# Patient Record
Sex: FEMALE | Race: WHITE | HISPANIC OR LATINO | Employment: UNEMPLOYED | URBAN - METROPOLITAN AREA
[De-identification: names, ages, dates, MRNs, and addresses within clinical notes are randomized per-mention and may not be internally consistent; named-entity substitution may affect disease eponyms.]

---
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
## 2023-01-03 ENCOUNTER — HOSPITAL ENCOUNTER (EMERGENCY)
Facility: HOSPITAL | Age: 18
Discharge: HOME | End: 2023-01-03
Admitting: OBSTETRICS & GYNECOLOGY

## 2023-01-03 VITALS
HEART RATE: 90 BPM | OXYGEN SATURATION: 100 % | RESPIRATION RATE: 19 BRPM | DIASTOLIC BLOOD PRESSURE: 74 MMHG | SYSTOLIC BLOOD PRESSURE: 127 MMHG | TEMPERATURE: 98.9 F

## 2023-01-03 PROBLEM — Z3A.36 36 WEEKS GESTATION OF PREGNANCY: Status: ACTIVE | Noted: 2022-07-20

## 2023-01-03 PROBLEM — Z3A.37 37 WEEKS GESTATION OF PREGNANCY: Status: ACTIVE | Noted: 2022-07-20

## 2023-01-03 NOTE — PROGRESS NOTES
OB/GYN prenatal visit    S: 21 y o   37w1d here for PN visit  She has no obstetric complaints, including pelvic pain, contractions, vaginal bleeding, loss of fluid, or decreased fetal movement  She has started feeling a little pelvic pressure    O:  Vitals:    23 1342   BP: 114/77   Pulse: 93       Gen: no acute distress, nonlabored breathing  Fundal Height (cm): 37 cm  Fetal Heart Rate: 125   SVE: 1 5/50/-3, posterior, soft  TAUS: Cephalic    A/P:      IUP at 37w1d  No obstetric complaints today  TWG: + 46lbs (recommended weight gain 25-35lbs)  Contraception: Nexplanon  GBS and GC/CT collected today  Discussed term labor precautions and fetal kick counts    Return to clinic in 1 weeks    COVID 19 precautions were discussed with patient at length, reviewed symptoms, hygiene, social distancing, patient to call office  with questions/concerns    Discussed with Dr Kimani Salinas MD  2023  1:49 PM    Problem List        Other    37 weeks gestation of pregnancy    Overview     LMP unknown  25-35 lb weight gain recommended  Contraception- Nexplanon- postplacental  Plans on breast feeding  Plans on epidural  1 hr GTT 93  Anemia:  11/3/22 repeat CBC 9 9  Delivery consent signed 11/3/22  Repeat growth US at 32 weeks 22- Pyelectasis is resolved,   2022 had Tdap vaccine   22 Flu vaccine  Covid vaccine x2  Plans spontaneous labor  Perineal massage reviewed 22           Anemia during pregnancy in third trimester    Overview     Iron BID , hgb on 11 3 was 9 9         Need for influenza vaccination    Overview     Given 22         Pyelectasis of fetus on prenatal ultrasound    Overview     4mm on right at 20 weeks   - Plan 32 week U/S  Is resolved 2022         Third trimester pregnancy

## 2023-01-04 NOTE — PROGRESS NOTES
L&D Triage Note - OB/GYN  Shannan Conner 21 y o  female MRN: [id number]  Unit/Bed#: LD TRIAGE 3-01 Encounter: [id number]      ASSESSMENT/PLAN  Shannan Conner is a 21 y o   at 36w6d resented for contraction      1) Contractions  -Cervical exam -3  -Discussed reasons to return, she is likely in early labor    2)  Discharge instructions  - Patient instructed to call if experiencing worsening contractions, vaginal bleeding, loss of fluid or decreased fetal movement  - Will follow up with OBGYN in office      She is a patient of Greg Ramachandran  D/w Dr Maria Alejandra Quintero, on call OBGYN Attending Physician  ______________    SUBJECTIVE    RANDOLPH: Estimated Date of Delivery: 23    HPI:  21 y o   37w0d presents with complaint of contractions  Contractions: yes  Leakage of fluid: no  Vaginal Bleeding: no  Fetal movement: present    ROS:  Constitutional: Negative  Respiratory: Negative  Cardiovascular: Negative    Gastrointestinal: Negative    Physical Exam  General: Well appearing, no distress  Respiratory: Unlabored breathing  Cardiovascular: Regular rate  Abdomen: Soft, gravid, nontender   Fundal Height: Appropriate for gestational age  Extremities: Warm and well perfused  Non tender  OBJECTIVE:  /74 (BP Location: Right arm)   Pulse 90   Temp 98 9 °F (37 2 °C) (Oral)   Resp 19   LMP  (LMP Unknown)   SpO2 100%   There is no height or weight on file to calculate BMI  Labs: No results found for this or any previous visit (from the past 24 hour(s))        SVE:  Cervical Dilation: 1  Cervical Effacement: 50  Fetal Station: -3  Method: Manual  OB Examiner: Ernesto    FHT:  Baseline Rate: 130 bpm  Variability: Moderate 6-25 bpm  Accelerations: 15 x 15 or greater, At variable times  Decelerations: None  FHR Category: Category I    TOCO:   Contraction Frequency (minutes): 2-3  Contraction Duration (seconds): 60-90  Contraction Quality: Mild        Phylliss Prow, DO  OB/GYN PGY-3  1/4/2023  1:44 AM      Portions of the record may have been created with voice recognition software  Occasional wrong word or "sound a like" substitutions may have occurred due to the inherent limitations of voice recognition software    Read the chart carefully and recognize, using context, where substitutions have occurred

## 2023-01-05 ENCOUNTER — ROUTINE PRENATAL (OUTPATIENT)
Dept: OBGYN CLINIC | Facility: CLINIC | Age: 18
End: 2023-01-05

## 2023-01-05 VITALS
SYSTOLIC BLOOD PRESSURE: 114 MMHG | DIASTOLIC BLOOD PRESSURE: 77 MMHG | HEIGHT: 59 IN | BODY MASS INDEX: 29.64 KG/M2 | HEART RATE: 93 BPM | WEIGHT: 147 LBS

## 2023-01-05 DIAGNOSIS — Z3A.37 37 WEEKS GESTATION OF PREGNANCY: ICD-10-CM

## 2023-01-05 DIAGNOSIS — Z34.93 THIRD TRIMESTER PREGNANCY: Primary | ICD-10-CM

## 2023-01-05 DIAGNOSIS — O99.013 ANEMIA DURING PREGNANCY IN THIRD TRIMESTER: ICD-10-CM

## 2023-01-06 LAB
C TRACH DNA SPEC QL NAA+PROBE: NEGATIVE
N GONORRHOEA DNA SPEC QL NAA+PROBE: NEGATIVE

## 2023-01-07 LAB — GP B STREP DNA SPEC QL NAA+PROBE: NEGATIVE

## 2023-01-10 ENCOUNTER — TELEPHONE (OUTPATIENT)
Dept: OBGYN CLINIC | Facility: CLINIC | Age: 18
End: 2023-01-10

## 2023-01-10 PROBLEM — Z3A.38 38 WEEKS GESTATION OF PREGNANCY: Status: ACTIVE | Noted: 2022-07-20

## 2023-01-10 NOTE — TELEPHONE ENCOUNTER
----- Message from Elkin Giron MD sent at 1/9/2023  6:30 AM EST -----  GBS negative, GC/CT negative   Can review further at next prenatal appointment

## 2023-01-10 NOTE — PROGRESS NOTES
OB/GYN prenatal visit    S: 21 y o   38w1d here for PN visit  She has no obstetric complaints, including pelvic pain, contractions, vaginal bleeding, loss of fluid, or decreased fetal movement  She has started feeling irregular contractions and has had more mucoid discharge recently  O:  Vitals:    23 1446   BP: 121/76   Pulse: 96       Gen: no acute distress, nonlabored breathing  Fundal Height (cm): 38 cm  Fetal Heart Rate: 125    A/P:      IUP at 38w1d  No obstetric complaints today  TWG: + 53lbs (recommended weight gain 25-35lbs)  Contraception: Nexplanon  Discussed term labor precautions and fetal kick counts    Return to clinic in 1 week    Reviewed ARRIVE trial and options for elective induction of labor starting at 39 weeks gestation  Reviewed recommendation for indicated induction of labor after 41 weeks  Reviewed risks, benefits, and alternatives, as well as process of induction  At this time, Shannan would like to plan for elective induction of labor  Reviewed process of induction including cervical ripening with cytotec and arnold balloon, as well as use of pitocin  Reviewed risks and benefits of induction with patient, including but not limited to  section for malpresentation, arrest disorders, or fetal intolerance, and operative delivery for fetal intolerance or maternal exhaustion  Patient aware of risks and benefits and elects to proceed with induction    Consent signed today, Hai Shelley scheduled for 3PM 23    COVID 19 precautions were discussed with patient at length, reviewed symptoms, hygiene, social distancing, patient to call office  with questions/concerns    Discussed with Dr Luis Fernando Munson MD  2023  3:10 PM    Problem List        Other    38 weeks gestation of pregnancy    Overview     LMP unknown  25-35 lb weight gain recommended  Contraception- Nexplanon- postplacental  Plans on breast feeding  Plans on epidural  1 hr GTT 93  Anemia:  11/3/22 repeat CBC 9 9  Delivery consent signed 11/3/22  Repeat growth US at 32 weeks 12/1/22- Pyelectasis is resolved,   11/03/2022 had Tdap vaccine   11/17/22 Flu vaccine  Covid vaccine x2  Plans spontaneous labor  Perineal massage reviewed 12/19/22  GBS neg 1/5/23         Anemia during pregnancy in third trimester    Overview     Iron BID , hgb on 11 3 was 9 9         Need for influenza vaccination    Overview     Given 11/17/22         Pyelectasis of fetus on prenatal ultrasound    Overview     4mm on right at 20 weeks   - Plan 32 week U/S  Is resolved 12/01/2022         Third trimester pregnancy

## 2023-01-11 PROCEDURE — T1002 RN SERVICES UP TO 15 MINUTES: HCPCS

## 2023-01-12 ENCOUNTER — ROUTINE PRENATAL (OUTPATIENT)
Dept: OBGYN CLINIC | Facility: CLINIC | Age: 18
End: 2023-01-12

## 2023-01-12 VITALS
DIASTOLIC BLOOD PRESSURE: 76 MMHG | WEIGHT: 154 LBS | HEIGHT: 59 IN | HEART RATE: 96 BPM | BODY MASS INDEX: 31.04 KG/M2 | SYSTOLIC BLOOD PRESSURE: 121 MMHG

## 2023-01-12 DIAGNOSIS — Z3A.38 38 WEEKS GESTATION OF PREGNANCY: ICD-10-CM

## 2023-01-12 DIAGNOSIS — Z34.93 THIRD TRIMESTER PREGNANCY: Primary | ICD-10-CM

## 2023-01-12 DIAGNOSIS — O99.013 ANEMIA DURING PREGNANCY IN THIRD TRIMESTER: ICD-10-CM

## 2023-01-18 ENCOUNTER — ANESTHESIA (INPATIENT)
Dept: ANESTHESIOLOGY | Facility: HOSPITAL | Age: 18
End: 2023-01-18

## 2023-01-18 ENCOUNTER — ANESTHESIA EVENT (INPATIENT)
Dept: ANESTHESIOLOGY | Facility: HOSPITAL | Age: 18
End: 2023-01-18

## 2023-01-18 ENCOUNTER — HOSPITAL ENCOUNTER (EMERGENCY)
Dept: LABOR AND DELIVERY | Facility: HOSPITAL | Age: 18
Discharge: HOME | End: 2023-01-18

## 2023-01-18 ENCOUNTER — HOSPITAL ENCOUNTER (INPATIENT)
Facility: HOSPITAL | Age: 18
LOS: 3 days | Discharge: HOME | End: 2023-01-21
Attending: OBSTETRICS & GYNECOLOGY | Admitting: OBSTETRICS & GYNECOLOGY

## 2023-01-18 DIAGNOSIS — Z3A.38 38 WEEKS GESTATION OF PREGNANCY: Primary | ICD-10-CM

## 2023-01-18 PROBLEM — Z3A.39 39 WEEKS GESTATION OF PREGNANCY: Status: ACTIVE | Noted: 2022-07-20

## 2023-01-18 LAB
ABO GROUP BLD: NORMAL
BASOPHILS # BLD AUTO: 0.01 THOUSANDS/ÂΜL (ref 0–0.1)
BASOPHILS NFR BLD AUTO: 0 % (ref 0–1)
BLD GP AB SCN SERPL QL: NEGATIVE
EOSINOPHIL # BLD AUTO: 0.01 THOUSAND/ÂΜL (ref 0–0.61)
EOSINOPHIL NFR BLD AUTO: 0 % (ref 0–6)
ERYTHROCYTE [DISTWIDTH] IN BLOOD BY AUTOMATED COUNT: 15.9 % (ref 11.6–15.1)
HCT VFR BLD AUTO: 32.8 % (ref 34.8–46.1)
HGB BLD-MCNC: 10.6 G/DL (ref 11.5–15.4)
HOLD SPECIMEN: NORMAL
IMM GRANULOCYTES # BLD AUTO: 0.03 THOUSAND/UL (ref 0–0.2)
IMM GRANULOCYTES NFR BLD AUTO: 1 % (ref 0–2)
LYMPHOCYTES # BLD AUTO: 1.34 THOUSANDS/ÂΜL (ref 0.6–4.47)
LYMPHOCYTES NFR BLD AUTO: 23 % (ref 14–44)
MCH RBC QN AUTO: 29 PG (ref 26.8–34.3)
MCHC RBC AUTO-ENTMCNC: 32.3 G/DL (ref 31.4–37.4)
MCV RBC AUTO: 90 FL (ref 82–98)
MONOCYTES # BLD AUTO: 0.44 THOUSAND/ÂΜL (ref 0.17–1.22)
MONOCYTES NFR BLD AUTO: 8 % (ref 4–12)
NEUTROPHILS # BLD AUTO: 3.94 THOUSANDS/ÂΜL (ref 1.85–7.62)
NEUTS SEG NFR BLD AUTO: 68 % (ref 43–75)
NRBC BLD AUTO-RTO: 0 /100 WBCS
PLATELET # BLD AUTO: 158 THOUSANDS/UL (ref 149–390)
PMV BLD AUTO: 12.5 FL (ref 8.9–12.7)
RBC # BLD AUTO: 3.66 MILLION/UL (ref 3.81–5.12)
RH BLD: POSITIVE
SPECIMEN EXPIRATION DATE: NORMAL
WBC # BLD AUTO: 5.77 THOUSAND/UL (ref 4.31–10.16)

## 2023-01-18 PROCEDURE — 4A1HXCZ MONITORING OF PRODUCTS OF CONCEPTION, CARDIAC RATE, EXTERNAL APPROACH: ICD-10-PCS | Performed by: STUDENT IN AN ORGANIZED HEALTH CARE EDUCATION/TRAINING PROGRAM

## 2023-01-18 RX ORDER — OXYTOCIN/RINGER'S LACTATE 30/500 ML
1-30 PLASTIC BAG, INJECTION (ML) INTRAVENOUS
Status: DISCONTINUED | OUTPATIENT
Start: 2023-01-19 | End: 2023-01-19

## 2023-01-18 RX ORDER — SODIUM CHLORIDE, SODIUM LACTATE, POTASSIUM CHLORIDE, CALCIUM CHLORIDE 600; 310; 30; 20 MG/100ML; MG/100ML; MG/100ML; MG/100ML
125 INJECTION, SOLUTION INTRAVENOUS CONTINUOUS
Status: DISCONTINUED | OUTPATIENT
Start: 2023-01-18 | End: 2023-01-19

## 2023-01-18 RX ORDER — LIDOCAINE HYDROCHLORIDE AND EPINEPHRINE 15; 5 MG/ML; UG/ML
INJECTION, SOLUTION EPIDURAL AS NEEDED
Status: DISCONTINUED | OUTPATIENT
Start: 2023-01-18 | End: 2023-01-20 | Stop reason: HOSPADM

## 2023-01-18 RX ADMIN — ROPIVACAINE HYDROCHLORIDE: 2 INJECTION, SOLUTION EPIDURAL; INFILTRATION at 22:40

## 2023-01-18 RX ADMIN — LIDOCAINE HYDROCHLORIDE AND EPINEPHRINE 2 ML: 15; 5 INJECTION, SOLUTION EPIDURAL at 22:32

## 2023-01-18 RX ADMIN — LIDOCAINE HYDROCHLORIDE AND EPINEPHRINE 3 ML: 15; 5 INJECTION, SOLUTION EPIDURAL at 22:30

## 2023-01-18 RX ADMIN — SODIUM CHLORIDE, SODIUM LACTATE, POTASSIUM CHLORIDE, AND CALCIUM CHLORIDE 125 ML/HR: .6; .31; .03; .02 INJECTION, SOLUTION INTRAVENOUS at 21:40

## 2023-01-18 NOTE — PLAN OF CARE
Problem: PAIN - ADULT  Goal: Verbalizes/displays adequate comfort level or baseline comfort level  Description: Interventions:  - Encourage patient to monitor pain and request assistance  - Assess pain using appropriate pain scale  - Administer analgesics based on type and severity of pain and evaluate response  - Implement non-pharmacological measures as appropriate and evaluate response  - Consider cultural and social influences on pain and pain management  - Notify physician/advanced practitioner if interventions unsuccessful or patient reports new pain  Outcome: Progressing     Problem: INFECTION - ADULT  Goal: Absence or prevention of progression during hospitalization  Description: INTERVENTIONS:  - Assess and monitor for signs and symptoms of infection  - Monitor lab/diagnostic results  - Monitor all insertion sites, i e  indwelling lines, tubes, and drains  - Monitor endotracheal if appropriate and nasal secretions for changes in amount and color  - [place] appropriate cooling/warming therapies per order  - Administer medications as ordered  - Instruct and encourage patient and family to use good hand hygiene technique  - Identify and instruct in appropriate isolation precautions for identified infection/condition  Outcome: Progressing  Goal: Absence of fever/infection during neutropenic period  Description: INTERVENTIONS:  - Monitor WBC    Outcome: Progressing     Problem: SAFETY ADULT  Goal: Patient will remain free of falls  Description: INTERVENTIONS:  - Educate patient/family on patient safety including physical limitations  - Instruct patient to call for assistance with activity   - Consult OT/PT to assist with strengthening/mobility   - Keep Call bell within reach  - Keep bed low and locked with side rails adjusted as appropriate  - Keep care items and personal belongings within reach  - Initiate and maintain comfort rounds  - Consider moving patient to room near nurses station  Outcome: Progressing  Goal: Maintain or return to baseline ADL function  Description: INTERVENTIONS:  -  Assess patient's ability to carry out ADLs; assess patient's baseline for ADL function and identify physical deficits which impact ability to perform ADLs (bathing, care of mouth/teeth, toileting, grooming, dressing, etc )  - Assess/evaluate cause of self-care deficits   - Assess range of motion  - Assess patient's mobility; develop plan if impaired  - Assess patient's need for assistive devices and provide as appropriate  - Encourage maximum independence but intervene and supervise when necessary  - Involve family in performance of ADLs  - Assess for home care needs following discharge   - Consider OT consult to assist with ADL evaluation and planning for discharge  - Provide patient education as appropriate  Outcome: Progressing  Goal: Maintains/Returns to pre admission functional level  Description: INTERVENTIONS:  - Perform BMAT or MOVE assessment daily    - Set and communicate daily mobility goal to care team and patient/family/caregiver  - Collaborate with rehabilitation services on mobility goals if consulted  - Out of bed for toileting  - Record patient progress and toleration of activity level   Outcome: Progressing     Problem: Knowledge Deficit  Goal: Patient/family/caregiver demonstrates understanding of disease process, treatment plan, medications, and discharge instructions  Description: Complete learning assessment and assess knowledge base  Interventions:  - Provide teaching at level of understanding  - Provide teaching via preferred learning methods  Outcome: Progressing  Goal: Verbalizes understanding of labor plan  Description: Assess patient/family/caregiver's baseline knowledge level and ability to understand information  Provide education via patient/family/caregiver's preferred learning method at appropriate level of understanding  1  Provide teaching at level of understanding    2  Provide teaching via preferred learning method(s)  Outcome: Progressing     Problem: DISCHARGE PLANNING  Goal: Discharge to home or other facility with appropriate resources  Description: INTERVENTIONS:  - Identify barriers to discharge w/patient and caregiver  - Arrange for needed discharge resources and transportation as appropriate  - Identify discharge learning needs (meds, wound care, etc )  - Arrange for interpretive services to assist at discharge as needed  - Refer to Case Management Department for coordinating discharge planning if the patient needs post-hospital services based on physician/advanced practitioner order or complex needs related to functional status, cognitive ability, or social support system  Outcome: Progressing     Problem: Labor & Delivery  Goal: Manages discomfort  Description: Assess and monitor for signs and symptoms of discomfort  Assess patient's pain level regularly and per hospital policy  Administer medications as ordered  Support use of nonpharmacological methods to help control pain such as distraction, imagery, relaxation, and application of heat and cold  Collaborate with interdisciplinary team and patient to determine appropriate pain management plan  1  Include patient in decisions related to comfort  2  Offer non-pharmacological pain management interventions  3  Report ineffective pain management to physician  Outcome: Progressing  Goal: Patient vital signs are stable  Description: 1  Assess vital signs - vaginal delivery    Outcome: Progressing

## 2023-01-18 NOTE — OB LABOR/OXYTOCIN SAFETY PROGRESS
Labor Progress Note - Shannan Chen Neighbours 21 y o  female MRN: [id number]    Unit/Bed#: LD TRIAGE 3-01 Encounter: [id number]       Contraction Frequency (minutes): 6-9  Contraction Quality: Mild  Tachysystole: No   Cervical Dilation: 1-2        Cervical Effacement: 50  Fetal Station: -2  Baseline Rate: 135 bpm  Fetal Heart Rate: 143 BPM                  Vital Signs:   Vitals:    01/18/23 1633   BP: 132/83   Pulse: 85   Resp: 20   Temp: 98 6 °F (37 °C)       Notes/comments: FHT Cat I, toco irregular  PROCEDURE:  ARNOLD BALLOON PLACEMENT    A 24F arnold with a 30cc balloon was selected, SVE was performed and cervix was located, arnold was introduced over sterile gloved hands  Balloon advanced through cervix beyond the internal cervical os  A small amount amount of sterile saline solution was instilled in the balloon to confirm placement  Placement was confirmed to be beyond the internal cervical os  A total of 60cc of sterile saline solution was placed into the balloon  Pt tolerated well  Instructions left with RN to place arnold to gravity with a 1L bag of IV fluid  Notify MD when arnold dislodged      Baldemar Lesches, MD Baldemar Lesches, MD 1/18/2023 6:46 PM

## 2023-01-18 NOTE — H&P
H & P- Obstetrics   [facility] Shoaib Tello 21 y o  female MRN: [id number]  Unit/Bed#: Alexis Carmichael Encounter: [id number]    Assessment: 21 y o   at 39w0d admitted for eIOL  SVE: 1 /-2  FHT: Cat I  Clinical EFW: 7 ; Cephalic confirmed by ultrasound  GBS status: negative   Postpartum contraception plan: Nexplanon    Plan:   Anemia during pregnancy in third trimester  Assessment & Plan  Hgb 10 6 on admission    * 39 weeks gestation of pregnancy  Assessment & Plan  Admit   T&S, CBC, RPR  CLD  IV fluids  GBS prophylaxis is not needed  Induction with FB in triage, pitocin when in labor room        Discussed case and plan w/ Dr Rolan Mckeon      Chief Complaint: Jazmin Avila    HPI: Chris Zavaleta is a 21 y o   with an RANDOLPH of 2023, by Ultrasound at 39w0d who is being admitted for eIOL  She is having uterine contractions q30-40 mins, has no LOF, and reports no VB  She states she has felt good FM  Patient Active Problem List   Diagnosis   • 39 weeks gestation of pregnancy   • Third trimester pregnancy   • Pyelectasis of fetus on prenatal ultrasound   • Need for influenza vaccination   • Anemia during pregnancy in third trimester       Baby complications/comments: none    Review of Systems   Constitutional: Negative for chills and fever  Respiratory: Negative for cough and shortness of breath  Cardiovascular: Negative for chest pain and leg swelling  Gastrointestinal: Negative for abdominal pain, nausea and vomiting  Genitourinary: Negative for dysuria, pelvic pain, urgency, vaginal bleeding and vaginal discharge  Neurological: Negative for dizziness, light-headedness and headaches  All other systems reviewed and are negative        OB Hx:  OB History    Para Term  AB Living   1             SAB IAB Ectopic Multiple Live Births                  # Outcome Date GA Lbr Stephen/2nd Weight Sex Delivery Anes PTL Lv   1 Current                Past Medical Hx:  Past Medical History: Diagnosis Date   • Anemia during pregnancy in third trimester 2022       Past Surgical hx:  History reviewed  No pertinent surgical history  Social Hx:  Alcohol use: no  Tobacco use: no  Other substance use: no    No Known Allergies    Medications Prior to Admission   Medication   • ferrous sulfate 324 (65 Fe) mg   • Prenatal Vit-Fe Fumarate-FA (M- Plus) 27-1 MG TABS   • docusate sodium (COLACE) 100 mg capsule       Objective:  Temp:  [98 6 °F (37 °C)] 98 6 °F (37 °C)  HR:  [85] 85  Resp:  [20] 20  BP: (132)/(83) 132/83  Body mass index is 31 1 kg/m²  Physical Exam:  Physical Exam  Constitutional:       Appearance: Normal appearance  Cardiovascular:      Rate and Rhythm: Normal rate and regular rhythm  Heart sounds: No murmur heard  No friction rub  No gallop  Pulmonary:      Effort: Pulmonary effort is normal  No respiratory distress  Breath sounds: No wheezing  Abdominal:      Palpations: Abdomen is soft  Tenderness: There is no abdominal tenderness  Musculoskeletal:         General: No swelling or tenderness  Neurological:      Mental Status: She is alert and oriented to person, place, and time  Skin:     General: Skin is warm and dry  Psychiatric:         Mood and Affect: Mood normal    Vitals reviewed              FHT:  Baseline Rate: 135 bpm  Variability: Moderate 6-25 bpm  Accelerations: 15 x 15 or greater, At variable times  Decelerations: None    TOCO:   Contraction Frequency (minutes): 6-9  Contraction Duration (seconds): 60-90  Contraction Quality: Mild    Lab Results   Component Value Date    WBC 5 77 2023    HGB 10 6 (L) 2023    HCT 32 8 (L) 2023     2023     Lab Results   Component Value Date    K 3 4 (L) 2022     2022    CO2 24 2022    BUN 5 2022    CREATININE 0 41 (L) 2022    AST 9 (L) 2022    ALT 5 (L) 2022     Prenatal Labs: Reviewed      Blood type: B Pos  Antibody: Neg  GBS: Neg  HIV: NonR  Rubella: Immune  VDRL/RPR: Non reactive  HBsAg: Negative  Chlamydia: Negative  Gonorrhea: Negative  Diabetes 1 hour screen: 93  Platelets: 695  Hgb: 10 6  >2 Midnights  INPATIENT     Signature/Title:  Kimmy Huber MD  Date: 1/18/2023  Time: 6:04 PM

## 2023-01-19 PROBLEM — O13.9 GESTATIONAL HYPERTENSION: Status: ACTIVE | Noted: 2023-01-19

## 2023-01-19 LAB
ALBUMIN SERPL BCP-MCNC: 3.2 G/DL (ref 3.5–5)
ALP SERPL-CCNC: 212 U/L (ref 34–104)
ALT SERPL W P-5'-P-CCNC: 7 U/L (ref 7–52)
ANION GAP SERPL CALCULATED.3IONS-SCNC: 8 MMOL/L (ref 4–13)
AST SERPL W P-5'-P-CCNC: 15 U/L (ref 13–39)
BASE EXCESS BLDCOA CALC-SCNC: -10.7 MMOL/L (ref 3–11)
BASE EXCESS BLDCOV CALC-SCNC: -6.8 MMOL/L (ref 1–9)
BILIRUB SERPL-MCNC: 1.21 MG/DL (ref 0.2–1)
BUN SERPL-MCNC: 9 MG/DL (ref 5–25)
CALCIUM ALBUM COR SERPL-MCNC: 9.5 MG/DL (ref 8.3–10.1)
CALCIUM SERPL-MCNC: 8.9 MG/DL (ref 8.4–10.2)
CHLORIDE SERPL-SCNC: 108 MMOL/L (ref 96–108)
CO2 SERPL-SCNC: 19 MMOL/L (ref 21–32)
CREAT SERPL-MCNC: 0.59 MG/DL (ref 0.6–1.3)
CREAT UR-MCNC: 177.3 MG/DL
ERYTHROCYTE [DISTWIDTH] IN BLOOD BY AUTOMATED COUNT: 16 % (ref 11.6–15.1)
GFR SERPL CREATININE-BSD FRML MDRD: 133 ML/MIN/1.73SQ M
GLUCOSE SERPL-MCNC: 72 MG/DL (ref 65–140)
HCO3 BLDCOA-SCNC: 17.1 MMOL/L (ref 17.3–27.3)
HCO3 BLDCOV-SCNC: 19.7 MMOL/L (ref 12.2–28.6)
HCT VFR BLD AUTO: 38 % (ref 34.8–46.1)
HGB BLD-MCNC: 12.1 G/DL (ref 11.5–15.4)
MCH RBC QN AUTO: 28.9 PG (ref 26.8–34.3)
MCHC RBC AUTO-ENTMCNC: 31.8 G/DL (ref 31.4–37.4)
MCV RBC AUTO: 91 FL (ref 82–98)
O2 CT VFR BLDCOA CALC: 6.2 ML/DL
OXYHGB MFR BLDCOA: 33.6 %
OXYHGB MFR BLDCOV: 59.5 %
PCO2 BLDCOA: 45.3 MM[HG] (ref 30–60)
PCO2 BLDCOV: 42.6 MM HG (ref 27–43)
PH BLDCOA: 7.2 [PH] (ref 7.23–7.43)
PH BLDCOV: 7.28 [PH] (ref 7.19–7.49)
PLATELET # BLD AUTO: 153 THOUSANDS/UL (ref 149–390)
PMV BLD AUTO: 12 FL (ref 8.9–12.7)
PO2 BLDCOA: 20.6 MM HG (ref 5–25)
PO2 BLDCOV: 29.5 MM HG (ref 15–45)
POTASSIUM SERPL-SCNC: 3.8 MMOL/L (ref 3.5–5.3)
PROT SERPL-MCNC: 6 G/DL (ref 6.4–8.4)
PROT UR-MCNC: 107 MG/DL
PROT/CREAT UR: 0.6 MG/G{CREAT} (ref 0–0.1)
RBC # BLD AUTO: 4.19 MILLION/UL (ref 3.81–5.12)
RPR SER QL: NORMAL
SAO2 % BLDCOV: 11.8 ML/DL
SODIUM SERPL-SCNC: 135 MMOL/L (ref 135–147)
WBC # BLD AUTO: 15.3 THOUSAND/UL (ref 4.31–10.16)

## 2023-01-19 PROCEDURE — 0KQM0ZZ REPAIR PERINEUM MUSCLE, OPEN APPROACH: ICD-10-PCS | Performed by: OBSTETRICS & GYNECOLOGY

## 2023-01-19 PROCEDURE — 3E033VJ INTRODUCTION OF OTHER HORMONE INTO PERIPHERAL VEIN, PERCUTANEOUS APPROACH: ICD-10-PCS | Performed by: OBSTETRICS & GYNECOLOGY

## 2023-01-19 PROCEDURE — 10907ZC DRAINAGE OF AMNIOTIC FLUID, THERAPEUTIC FROM PRODUCTS OF CONCEPTION, VIA NATURAL OR ARTIFICIAL OPENING: ICD-10-PCS | Performed by: OBSTETRICS & GYNECOLOGY

## 2023-01-19 RX ORDER — DOCUSATE SODIUM 100 MG/1
100 CAPSULE, LIQUID FILLED ORAL 2 TIMES DAILY
Status: DISCONTINUED | OUTPATIENT
Start: 2023-01-19 | End: 2023-01-21 | Stop reason: HOSPADM

## 2023-01-19 RX ORDER — ONDANSETRON 2 MG/ML
4 INJECTION INTRAMUSCULAR; INTRAVENOUS EVERY 4 HOURS PRN
Status: DISCONTINUED | OUTPATIENT
Start: 2023-01-19 | End: 2023-01-19

## 2023-01-19 RX ORDER — CALCIUM CARBONATE 200(500)MG
1000 TABLET,CHEWABLE ORAL DAILY PRN
Status: DISCONTINUED | OUTPATIENT
Start: 2023-01-19 | End: 2023-01-21 | Stop reason: HOSPADM

## 2023-01-19 RX ORDER — OXYTOCIN/RINGER'S LACTATE 30/500 ML
250 PLASTIC BAG, INJECTION (ML) INTRAVENOUS ONCE
Status: COMPLETED | OUTPATIENT
Start: 2023-01-19 | End: 2023-01-19

## 2023-01-19 RX ORDER — DIPHENHYDRAMINE HCL 25 MG
25 TABLET ORAL EVERY 6 HOURS PRN
Status: DISCONTINUED | OUTPATIENT
Start: 2023-01-19 | End: 2023-01-21 | Stop reason: HOSPADM

## 2023-01-19 RX ORDER — ACETAMINOPHEN 325 MG/1
650 TABLET ORAL EVERY 4 HOURS PRN
Status: DISCONTINUED | OUTPATIENT
Start: 2023-01-19 | End: 2023-01-21 | Stop reason: HOSPADM

## 2023-01-19 RX ORDER — DIAPER,BRIEF,INFANT-TODD,DISP
1 EACH MISCELLANEOUS DAILY PRN
Status: DISCONTINUED | OUTPATIENT
Start: 2023-01-19 | End: 2023-01-21 | Stop reason: HOSPADM

## 2023-01-19 RX ORDER — IBUPROFEN 600 MG/1
600 TABLET ORAL EVERY 6 HOURS
Status: DISCONTINUED | OUTPATIENT
Start: 2023-01-19 | End: 2023-01-21 | Stop reason: HOSPADM

## 2023-01-19 RX ORDER — ONDANSETRON 2 MG/ML
4 INJECTION INTRAMUSCULAR; INTRAVENOUS EVERY 8 HOURS PRN
Status: DISCONTINUED | OUTPATIENT
Start: 2023-01-19 | End: 2023-01-21 | Stop reason: HOSPADM

## 2023-01-19 RX ORDER — SIMETHICONE 80 MG
80 TABLET,CHEWABLE ORAL 4 TIMES DAILY PRN
Status: DISCONTINUED | OUTPATIENT
Start: 2023-01-19 | End: 2023-01-21 | Stop reason: HOSPADM

## 2023-01-19 RX ADMIN — SODIUM CHLORIDE, SODIUM LACTATE, POTASSIUM CHLORIDE, AND CALCIUM CHLORIDE 125 ML/HR: .6; .31; .03; .02 INJECTION, SOLUTION INTRAVENOUS at 02:10

## 2023-01-19 RX ADMIN — Medication 2 MILLI-UNITS/MIN: at 00:02

## 2023-01-19 RX ADMIN — DOCUSATE SODIUM 100 MG: 100 CAPSULE, LIQUID FILLED ORAL at 17:45

## 2023-01-19 RX ADMIN — ROPIVACAINE HYDROCHLORIDE: 2 INJECTION, SOLUTION EPIDURAL; INFILTRATION at 11:24

## 2023-01-19 RX ADMIN — IBUPROFEN 600 MG: 600 TABLET, FILM COATED ORAL at 20:20

## 2023-01-19 RX ADMIN — ROPIVACAINE HYDROCHLORIDE: 2 INJECTION, SOLUTION EPIDURAL; INFILTRATION at 05:55

## 2023-01-19 RX ADMIN — IBUPROFEN 600 MG: 600 TABLET, FILM COATED ORAL at 15:13

## 2023-01-19 RX ADMIN — ONDANSETRON 4 MG: 2 INJECTION INTRAMUSCULAR; INTRAVENOUS at 08:09

## 2023-01-19 RX ADMIN — Medication 250 MILLI-UNITS/MIN: at 13:43

## 2023-01-19 RX ADMIN — SODIUM CHLORIDE, SODIUM LACTATE, POTASSIUM CHLORIDE, AND CALCIUM CHLORIDE 125 ML/HR: .6; .31; .03; .02 INJECTION, SOLUTION INTRAVENOUS at 05:33

## 2023-01-19 NOTE — OB LABOR/OXYTOCIN SAFETY PROGRESS
Oxytocin Safety Progress Check Note - Shannan Tello 21 y o  female MRN: [id number]    Unit/Bed#: -01 Encounter: [id number]    Dose (leela-units/min) Oxytocin: 8 leela-units/min  Contraction Frequency (minutes): 1 5-4  Contraction Quality: Strong  Tachysystole: No   Cervical Dilation: 9        Cervical Effacement: 90  Fetal Station: -1  Baseline Rate: 140 bpm  Fetal Heart Rate: 132 BPM  FHR Category: Category II               Vital Signs:   Vitals:    01/19/23 0645   BP: 130/88   Pulse: (!) 106   Resp:    Temp:    SpO2:        Notes/comments: Patient feeling pressure, FHT Cat I, toco q2  Discussed with attending          Valdemar Mehta MD 1/19/2023 6:59 AM

## 2023-01-19 NOTE — OB LABOR/OXYTOCIN SAFETY PROGRESS
Oxytocin Safety Progress Check Note - Shannan Lynch [place]ors 21 y o  female MRN: [id number]    Unit/Bed#: -01 Encounter: [id number]    Dose (leela-units/min) Oxytocin: 8 leela-units/min  Contraction Frequency (minutes): 1-2 5  Contraction Quality: Strong  Tachysystole: No   Cervical Dilation: 10  Dilation Complete Date: 01/19/23     Cervical Effacement: 100  Fetal Station: 2  Baseline Rate: 130 bpm  Fetal Heart Rate: 128 BPM  FHR Category: Category I    Vital Signs:   Vitals:    01/19/23 0730   BP: 143/100   Pulse: (!) 111   Resp:    Temp:    SpO2:        Notes/comments:     Shannan is feeling uncomfortable  On cervical exam, she is complete and +2  FHT is category I  Plan to start pushing         Mike Maria, [place] 1/19/2023 7:48 AM

## 2023-01-19 NOTE — OB LABOR/OXYTOCIN SAFETY PROGRESS
Oxytocin Safety Progress Check Note - Shannan Zhu 21 y o  female MRN: [id number]    Unit/Bed#: -01 Encounter: [id number]    Dose (leela-units/min) Oxytocin: 4 leela-units/min  Contraction Frequency (minutes): 1-4  Contraction Quality: Moderate  Tachysystole: No   Cervical Dilation: 5-6  Cervical Effacement: 80  Fetal Station: -2  Baseline Rate: 140 bpm  Fetal Heart Rate: 130 BPM  FHR Category: Category I      Vital Signs:   Vitals:    01/19/23 0330   BP: 118/72   Pulse: 100   Resp:    Temp:    SpO2:        Notes/comments: Shannan is comfortable with her epidural  Goo cervical change noted  AROM performed  Thin meconium noted  Tracing cat I  Continue to titrate Pitocin  Will discuss with Dr Jeri JIMENEZ MD 1/19/2023 3:56 AM

## 2023-01-19 NOTE — PLAN OF CARE
Problem: PAIN - ADULT  Goal: Verbalizes/displays adequate comfort level or baseline comfort level  Description: Interventions:  - Encourage patient to monitor pain and request assistance  - Assess pain using appropriate pain scale  - Administer analgesics based on type and severity of pain and evaluate response  - Implement non-pharmacological measures as appropriate and evaluate response  - Consider cultural and social influences on pain and pain management  - Notify physician/advanced practitioner if interventions unsuccessful or patient reports new pain  Outcome: Progressing     Problem: INFECTION - ADULT  Goal: Absence or prevention of progression during hospitalization  Description: INTERVENTIONS:  - Assess and monitor for signs and symptoms of infection  - Monitor lab/diagnostic results  - Monitor all insertion sites, i e  indwelling lines, tubes, and drains  - [place] appropriate cooling/warming therapies per order  - Administer medications as ordered  - Instruct and encourage patient and family to use good hand hygiene technique  Outcome: Progressing  Goal: Absence of fever/infection during neutropenic period  Description: INTERVENTIONS:  - Monitor WBC    Outcome: Progressing     Problem: SAFETY ADULT  Goal: Patient will remain free of falls  Description: INTERVENTIONS:  - Educate patient/family on patient safety including physical limitations  - Instruct patient to call for assistance with activity   - Consult OT/PT to assist with strengthening/mobility   - Keep Call bell within reach  - Keep bed low and locked with side rails adjusted as appropriate  - Keep care items and personal belongings within reach  - Initiate and maintain comfort rounds  Outcome: Progressing  Goal: Maintain or return to baseline ADL function  Description: INTERVENTIONS:  -  Assess patient's ability to carry out ADLs; assess patient's baseline for ADL function and identify physical deficits which impact ability to perform ADLs (bathing, care of mouth/teeth, toileting, grooming, dressing, etc )  - Assess/evaluate cause of self-care deficits   - Assess range of motion  - Assess patient's mobility; develop plan if impaired  - Assess patient's need for assistive devices and provide as appropriate  - Encourage maximum independence but intervene and supervise when necessary  - Involve family in performance of ADLs  - Assess for home care needs following discharge   - Consider OT consult to assist with ADL evaluation and planning for discharge  - Provide patient education as appropriate  Outcome: Progressing  Goal: Maintains/Returns to pre admission functional level  Description: INTERVENTIONS:  - Perform BMAT or MOVE assessment daily    - Set and communicate daily mobility goal to care team and patient/family/caregiver  - Collaborate with rehabilitation services on mobility goals if consulted  - Out of bed for toileting  - Record patient progress and toleration of activity level   Outcome: Progressing     Problem: Knowledge Deficit  Goal: Patient/family/caregiver demonstrates understanding of disease process, treatment plan, medications, and discharge instructions  Description: Complete learning assessment and assess knowledge base  Interventions:  - Provide teaching at level of understanding  - Provide teaching via preferred learning methods  Outcome: Progressing  Goal: Verbalizes understanding of labor plan  Description: Assess patient/family/caregiver's baseline knowledge level and ability to understand information  Provide education via patient/family/caregiver's preferred learning method at appropriate level of understanding  1  Provide teaching at level of understanding  2  Provide teaching via preferred learning method(s)    Outcome: Progressing     Problem: DISCHARGE PLANNING  Goal: Discharge to home or other facility with appropriate resources  Description: INTERVENTIONS:  - Identify barriers to discharge w/patient and caregiver  - Arrange for needed discharge resources and transportation as appropriate  - Identify discharge learning needs (meds, wound care, etc )  - Arrange for interpretive services to assist at discharge as needed  - Refer to Case Management Department for coordinating discharge planning if the patient needs post-hospital services based on physician/advanced practitioner order or complex needs related to functional status, cognitive ability, or social support system  Outcome: Progressing     Problem: Labor & Delivery  Goal: Manages discomfort  Description: Assess and monitor for signs and symptoms of discomfort  Assess patient's pain level regularly and per hospital policy  Administer medications as ordered  Support use of nonpharmacological methods to help control pain such as distraction, imagery, relaxation, and application of heat and cold  Collaborate with interdisciplinary team and patient to determine appropriate pain management plan  1  Include patient in decisions related to comfort  2  Offer non-pharmacological pain management interventions  3  Report ineffective pain management to physician  Outcome: Progressing  Goal: Patient vital signs are stable  Description: 1  Assess vital signs - vaginal delivery    Outcome: Progressing

## 2023-01-19 NOTE — OB LABOR/OXYTOCIN SAFETY PROGRESS
Oxytocin Safety Progress Check Note - Shannan Bro 21 y o  female MRN: [id number]    Unit/Bed#: -01 Encounter: [id number]    Dose (leela-units/min) Oxytocin: 6 leela-units/min  Contraction Frequency (minutes): 2  Contraction Quality: Moderate, Strong  Tachysystole: No     Cervical Dilation: 10  Dilation Complete Date: 01/19/23  Cervical Effacement: 100  Fetal Station: 3     Baseline Rate: 120 bpm  Fetal Heart Rate: 120 BPM  FHR Category: Category I     Patient able to rest during pushing break, didn't sleep  Now +3, still ROP position  Good effort pushing with Dr Joseph Gregory  FHT somewhat discontinuous during pushing, moderate variability and large accels still seen  Patient with severe-range BPs, 15 min repeat while pushing  Additional repeat in between contractions noted to be 156/76      Vital Signs:   Vitals:    01/19/23 1147   BP: 135/90   Pulse: 103   Resp:    Temp:    SpO2:      Pt seen w/ Dr Josesito Lisa MD 1/19/2023 12:35 PM

## 2023-01-19 NOTE — OB LABOR/OXYTOCIN SAFETY PROGRESS
Labor Progress Note - Shannan Hernandez Kinds 21 y o  female MRN: [id number]    Unit/Bed#: -01 Encounter: [id number]       Contraction Frequency (minutes): 6-9  Contraction Quality: Mild  Tachysystole: No   Cervical Dilation: 4        Cervical Effacement: 70  Fetal Station: -2  Baseline Rate: 135 bpm  Fetal Heart Rate: 143 BPM                  Vital Signs:   Vitals:    01/18/23 2132   BP: 148/90   Pulse: 86   Resp: 18   Temp: 98 °F (36 7 °C)   SpO2: 100%       Notes/comments: FB out, patient helena and requesting epidural  Discussed with attending          Kimmy Huber MD 1/18/2023 9:47 PM

## 2023-01-19 NOTE — ANESTHESIA PREPROCEDURE EVALUATION
Procedure:  LABOR ANALGESIA    Relevant Problems   GYN   (+) 39 weeks gestation of pregnancy      HEMATOLOGY   (+) Anemia during pregnancy in third trimester      Lab Results   Component Value Date    SODIUM 136 07/24/2022    K 3 4 (L) 07/24/2022     07/24/2022    CO2 24 07/24/2022    AGAP 6 07/24/2022    BUN 5 07/24/2022    CREATININE 0 41 (L) 07/24/2022    GLUC 84 07/24/2022    CALCIUM 9 2 07/24/2022    AST 9 (L) 07/24/2022    ALT 5 (L) 07/24/2022    ALKPHOS 35 07/24/2022    TP 6 3 (L) 07/24/2022    TBILI 0 40 07/24/2022    EGFR 150 07/24/2022     Lab Results   Component Value Date    WBC 5 77 01/18/2023    HGB 10 6 (L) 01/18/2023    HCT 32 8 (L) 01/18/2023    MCV 90 01/18/2023     01/18/2023       Physical Exam    Airway    Mallampati score: I  TM Distance: >3 FB  Neck ROM: full     Dental       Cardiovascular      Pulmonary      Other Findings        Anesthesia Plan  ASA Score- 2     Anesthesia Type- IV sedation with anesthesia with ASA Monitors  Additional Monitors:   Airway Plan:           Plan Factors-Exercise tolerance (METS): >4 METS  Chart reviewed  EKG reviewed  Imaging results reviewed  Existing labs reviewed  Patient summary reviewed  Induction-     Postoperative Plan-     Informed Consent- Anesthetic plan and risks discussed with patient  I personally reviewed this patient with the CRNA  Discussed and agreed on the Anesthesia Plan with the CRNA  Tatianna Ibarra

## 2023-01-19 NOTE — DISCHARGE SUMMARY
Discharge Summary - Man Looney 21 y o  female MRN: [id number]    Unit/Bed#: -01 Encounter: [id number]    Admission Date: 2023     Discharge Date: 23    Patient Active Problem List   Diagnosis   •  (spontaneous vaginal delivery)   • Third trimester pregnancy   • Pyelectasis of fetus on prenatal ultrasound   • Need for influenza vaccination   • Anemia during pregnancy in third trimester         [facility]N Practice: [facility] Course:     Man Looney is a 21 y o   who was admitted at 36w3d for elective induction of labor, on initial cervical check she was 1 /-2  She received a Alcala balloon for cervical ripening  She received an epidural for analgesia at this time  The Alcala balloon became dislodged and she was subsequently started on a Pitocin titration  She had artificial rupture of membranes for thin meconium stained fluid  She progressed to complete cervical dilation and began pushing  After pushing for 3 hours  Patient was given time to labor down pushing was resumed  She pushed for an additional hour and a half, and delivered a healthy  at 46  The delivery was complicated by a shoulder dystocia  During labor she was diagnosed with gestational hypertension  She delivered a viable female  on 2023 at 1342  Weight 7lbs 9oz via spontaneous vaginal delivery  Apgars were 7 (1 min) and 9 (5 min)   was transferred to  nursery  Patient tolerated the procedure well and was transferred to recovery in stable condition  Her post-partum course was uncomplicated  Her post-partum pain was well controlled with oral analgesics  She requested a Nexplanon for contraception  On day of discharge, she was ambulating and able to reasonably perform all ADLs  She was voiding and had appropriate bowel function  Pain was well controlled  She was discharged home on postpartum day #2 without complications   Patient was instructed to follow up with her OB as an outpatient and was given appropriate warnings to call doctor or provider if she develops signs of infection or uncontrolled pain  On day of discharge she was ambulating, voiding spontaneously, tolerating oral intake and hemodynamically stable  Disposition: Home    Planned Readmission: No    Discharge Medications:   Please see AVS    Discharge instructions :   -Do not place anything (no partner, tampons or douche) in your vagina for 6 weeks  -You may walk for exercise for the first 6 weeks then gradually return to your usual activities    -Please do not drive for 1 week if you have no stitches and for 2 weeks if you have stitches or underwent a  delivery     -You may take baths or shower per your preference    -Please look at your bust (breasts) in the mirror daily and call your doctor for redness or tenderness or increased warmth  - If you have had a  section please look at your incision daily as well and call provider for increasing redness or steady drainage from the incision    -Please call your doctor's office if temperature > 100 4*F or 38* C, worsening pain or a foul discharge      Follow Up:  - Follow up in 1 week for postpartum visit and blood pressure check

## 2023-01-19 NOTE — DISCHARGE SUMMARY
Discharge Summary - Nancy Laws 21 y o  female MRN: [id number]    Unit/Bed#: -01 Encounter: [id number]    Admission Date: 2023     Discharge Date: ***    Admitting Attending: Sravanthi Holman   Delivering Attending: Carolyn Burton   Discharge Attending: ***    Admission Diagnosis:   Pregnancy at 39w0d  Anemia during pregnancy     Discharge Diagnosis:  Same as above      Procedures: Spontaneous Vaginal Delivery    Complications: none apparent    Hospital Course: Patient was admitted for eIOL  She was managed expectantly for labor and received an epidural for pain control  A arnold balloon was placed and patient made change to 4cm at which point the balloon was out  Patient was started on low dose pitocin titration and was AROM with thin meconium  Patient made change to complete and began pushing  She delivered via  with repair of second degree lacerations  She had an uncomplicated postpartum course  Condition at discharge: {condition:96500}     On day of discharge, patient was tolerating PO, passing flatus, urinating, and ambulating  Her pain was well controlled with oral analgesics  She was discharged home on postpartum day #*** with standard post partum instructions to follow up with her physician in 3-6 weeks for a postpartum appointment  Discharge instructions/Information to patient and family:   - Do not place anything (no partner, tampons or douche) in your vagina for 6 weeks  -You may walk for exercise for the first 6 weeks then gradually return to your usual activities    -Please do not drive for 1 week if you have no stitches and for 2 weeks if you have stitches or underwent a  delivery     -You may take baths or shower per your preference    -Please look at your bust (breasts) in the mirror daily and call for redness or tenderness or increased warmth    -Please call us for temperature > 100 4*F or 38* C, worsening pain or a foul discharge        Discharge Medications: Prenatal vitamin daily for 6 months or the duration of nursing whichever is longer  Motrin 600 mg orally every 6 hours as needed for pain  Tylenol (over the counter) per bottle directions as needed for pain: do NOT use with percocet  Hydrocortisone cream 1% (over the counter) applied 1-2x daily to hemorrhoids as needed    Provisions for Follow-Up Care: Follow up with your doctor in 3-6 weeks for postpartum care       Planned Readmission: No    ***

## 2023-01-19 NOTE — ANESTHESIA PROCEDURE NOTES
Epidural Block    Patient location during procedure: OB  Start time: 1/18/2023 10:29 PM  Reason for block: procedure for pain and at surgeon's request  Staffing  Performed: Anesthesiologist   Anesthesiologist: Antoine Panchal MD  Preanesthetic Checklist  Completed: patient identified, IV checked, site marked, risks and benefits discussed, surgical consent, monitors and equipment checked, pre-op evaluation and timeout performed  Epidural  Patient position: sitting  Prep: ChloraPrep  Patient monitoring: cardiac monitor, frequent blood pressure checks and continuous pulse ox  Approach: midline  Location: lumbar  Injection technique: JONN saline  Needle  Needle type: Tuohy   Needle gauge: 18 G  Catheter type: side hole  Catheter size: 20 G  Catheter at skin depth: 11 cm  Catheter securement method: stabilization device  Test dose: negative  Assessment  Number of attempts: 1negative aspiration for CSF, negative aspiration for heme and no paresthesia on injection  patient tolerated the procedure well with no immediate complications  Additional Notes  Single skin pass  Easy placement

## 2023-01-19 NOTE — ANESTHESIA PREPROCEDURE EVALUATION
Procedure:  LABOR ANALGESIA    Relevant Problems   GYN   (+) 39 weeks gestation of pregnancy      HEMATOLOGY   (+) Anemia during pregnancy in third trimester      Lab Results   Component Value Date    WBC 5 77 01/18/2023    HGB 10 6 (L) 01/18/2023    HCT 32 8 (L) 01/18/2023    MCV 90 01/18/2023     01/18/2023     Lab Results   Component Value Date    SODIUM 136 07/24/2022    K 3 4 (L) 07/24/2022     07/24/2022    CO2 24 07/24/2022    AGAP 6 07/24/2022    BUN 5 07/24/2022    CREATININE 0 41 (L) 07/24/2022    GLUC 84 07/24/2022    CALCIUM 9 2 07/24/2022    AST 9 (L) 07/24/2022    ALT 5 (L) 07/24/2022    ALKPHOS 35 07/24/2022    TP 6 3 (L) 07/24/2022    TBILI 0 40 07/24/2022    EGFR 150 07/24/2022

## 2023-01-19 NOTE — ANESTHESIA POSTPROCEDURE EVALUATION
Post-Op Assessment Note    CV Status:  Stable    Pain management: adequate     Mental Status:  Alert and awake   Hydration Status:  Euvolemic   PONV Controlled:  Controlled   Airway Patency:  Patent      Post Op Vitals Reviewed: Yes      Staff: Anesthesiologist, CRNA     Post-op block assessment: no complications, site cleaned and catheter intact      No notable events documented      /96 (01/19/23 1500)    Temp      Pulse     Resp      SpO2

## 2023-01-19 NOTE — L&D DELIVERY NOTE
DELIVERY NOTE  Shannan Monte 21 y o  female MRN: [id number]  Unit/Bed#: -01 Encounter: [id number]    Obstetrician:    Dr Tu Garrido MD    Assistant:   Dr Malou Strong MD    Pre-Delivery Diagnosis:   Patient Active Problem List   Diagnosis   • 39 weeks gestation of pregnancy   • Third trimester pregnancy   • Pyelectasis of fetus on prenatal ultrasound   • Need for influenza vaccination   • Anemia during pregnancy in third trimester         Post-Delivery Diagnosis:   Same as above - Delivered  2nd degree laceration      Procedure:  Spontaneous vaginal delivery      Anesthesia:  epidural    Specimens:   Cord blood obtained   Placenta; normal appearing, central insertion, intact   Arterial and venous blood gases (below)     Gases:  Umbilical Cord Venous Blood Gas:  Results from last 7 days   Lab Units 01/19/23  1348   PH COV  7 282   PCO2 COV mm HG 42 6   HCO3 COV mmol/L 19 7   BASE EXC COV mmol/L -6 8*   O2 CT CD VB mL/dL 11 8   O2 HGB, VENOUS CORD % 95 0     Umbilical Cord Arterial Blood Gas:  Results from last 7 days   Lab Units 01/19/23  1348   PH COA  7 195*   PCO2 COA  45 3   PO2 COA mm HG 20 6   HCO3 COA mmol/L 17 1*   BASE EXC COA mmol/L -10 7*   O2 CONTENT CORD ART ml/dl 6 2   O2 HGB, ARTERIAL CORD % 33 6       Quantitative Blood Loss:   262 mL           Complications:    None  Shoulder dystocia    Brief Description of Labor Course:  Shannan Monte is a 21 y o  [address] [facility] female at 39w0d who was admitted to L&D for elective induction of labor, on initial cervical check she was 1 5/50/-2  She received a Alcala balloon for cervical ripening  She received an epidural for analgesia at this time  The Alcala balloon became dislodged and she was subsequently started on a Pitocin titration  She had artificial rupture of membranes for thin meconium stained fluid  She progressed to complete cervical dilation and began pushing  After pushing for 3 hours    Patient was given time to labor down pushing was resumed  She pushed for an additional hour and a half, and delivered a healthy  at 46  Description of Delivery:   With  the assistance of maternal expulsive forces, the fetal vertex delivered spontaneously  A nuchal cord was not noted  Attempt was made to deliver the anterior right shoulder it was not noted to come easily  Shraddha maneuver was performed and suprapubic pressure was applied with the assistance of maternal expulsive forces the shoulder dystocia was resolved  The anterior shoulder was delivered and the contralateral arm was delivered with gentle upward traction  The remainder of the fetus delivered spontaneously at 46, resulting in a viable female   Upon delivery, the infant was placed on the mothers abdomen and the cord was doubly clamped and cut after 30 seconds  The  was noted to have good tone and cry spontaneously  There was no evidence of injury  The  was passed off to  staff for evaluation  Umbilical cord blood and umbilical artery and venous gases were collected and sent to the lab  An intact placenta was delivered spontaneously at  using fundal massage and gentle cord traction and was noted to have a centrally-inserted 3-vessel cord  Active management of the third stage of labor was undertaken with IV pitocin at 250milliunits/min  Inspection of the perineum, vagina, labia, cervix, and urethra revealed a 2nd degree laceration  Bleeding was noted to be under control  A bimanual exam was performed    Outcome:  Living  with APGARS 7 (1 min) and 9 (5 min)   weight: pending    Perineal Inspection/Laceration Repair  Inspection of the perineum, vagina, labia, cervix, and urethra revealed a 2nd degree laceration, which was repaired in standard fashion with 3-0 Vicryl rapide  Patient was comfortable with epidural at that time  The laceration showed good tissue reapproximation and hemostasis      At the conclusion of the delivery, all needle, sponge, and instrument counts were noted to be correct  Patient tolerated the procedure well and was allowed to recover in labor and delivery room with family and  before being transferred to the post-partum floor  Conclusion:  Mother and baby are currently recovering nicely in stable condition  Attending Supervision:   Dr Vanessa Gracia MD was present for the entire procedure      Oscar Hale MD   OB/GYN PGY-1   2023 2:16 PM

## 2023-01-19 NOTE — OB LABOR/OXYTOCIN SAFETY PROGRESS
Labor Progress Note - Shannan Méndez Adelaida 21 y o  female MRN: [id number]    Unit/Bed#: -01 Encounter: [id number]    Dose (leela-units/min) Oxytocin: 8 leela-units/min  Contraction Frequency (minutes): 1 5-3  Contraction Quality: Moderate  Tachysystole: No   Cervical Dilation: 8  Cervical Effacement: 90  Fetal Station: -1  Baseline Rate: 135 bpm  Fetal Heart Rate: 130 BPM  FHR Category: Category I               Vital Signs:   Vitals:    23 0533   BP: 126/73   Pulse: 96   Resp:    Temp:    SpO2:        Notes/comments: Patient is feeling increased rectal pressure  Cervical exam as above  Fetal heart tracing remains category 1  Continue Pitocin  Anticipate           Kalina Bradford MD 2023 5:49 AM

## 2023-01-19 NOTE — OB LABOR/OXYTOCIN SAFETY PROGRESS
Oxytocin Safety Progress Check Note - Shannan Chen Neighbours 21 y o  female MRN: [id number]    Unit/Bed#: -01 Encounter: [id number]    Dose (leela-units/min) Oxytocin: 2 leela-units/min (Per Dr Baldemar Lesches)  Contraction Frequency (minutes): 2-3  Contraction Quality: Moderate  Tachysystole: No   Cervical Dilation:  (deferred)  Cervical Effacement:  (deferred)  Fetal Station:  (deferred)  Baseline Rate: 135 bpm  Fetal Heart Rate: 142 BPM  FHR Category: Category I      Vital Signs:   Vitals:    01/19/23 0315   BP: 122/60   Pulse: 97   Resp:    Temp:    SpO2:        Notes/comments: patient resting comfortably with epidural  Cervical exam deferred at this time  Fetal heart tracing is category I  Continue to monitor  Assess for AROM with next check  Will discuss with Dr Gopi JIMENEZ MD 1/19/2023 3:18 AM

## 2023-01-19 NOTE — OB LABOR/OXYTOCIN SAFETY PROGRESS
Oxytocin Safety Progress Check Note - Shannan Pappas Gone 21 y o  female MRN: [id number]    Unit/Bed#: -01 Encounter: [id number]    Dose (leela-units/min) Oxytocin: 6 leela-units/min  Contraction Frequency (minutes): 1 5-3  Contraction Quality: Moderate, Strong  Tachysystole: No     Cervical Dilation: 10  Dilation Complete Date: 01/19/23  Cervical Effacement: 100  Fetal Station: 2     Baseline Rate: 120 bpm  Fetal Heart Rate: 120 BPM  FHR Category: Category I    Pt pushing; tolerating well overall but becoming tired  Pitocin decreased from 8 to 6 while pushing d/t concern for tachysystole  Arnold previously with 5cc in balloon due to obstruction in front of fetal head; arnold came out with pushing  Or note, patient with intermittent severe range blood pressures  Will collect CBC, CMP, UP:C  Vital Signs:   Vitals:    01/19/23 1001   BP: 159/80   Pulse: 86   Resp:    Temp:    SpO2:        Notes/comments:   Patient has been pushing for nearly 3 hours with minimal descent, possibly ROP position  Plan at this time to stop pushing, utilize the peanut ball in an attempt to rotate the baby  Plan to start pushing again at 1200  Will re-insert arnold and collect PreE labs d/t intermittent severe range pressures      Patient seen w/ Dr Vahe Lux MD 1/19/2023 10:39 AM

## 2023-01-20 LAB
DME PARACHUTE DELIVERY DATE REQUESTED: NORMAL
DME PARACHUTE ITEM DESCRIPTION: NORMAL
DME PARACHUTE ORDER STATUS: NORMAL
DME PARACHUTE SUPPLIER NAME: NORMAL
DME PARACHUTE SUPPLIER PHONE: NORMAL

## 2023-01-20 RX ADMIN — IBUPROFEN 600 MG: 600 TABLET, FILM COATED ORAL at 12:00

## 2023-01-20 RX ADMIN — DOCUSATE SODIUM 100 MG: 100 CAPSULE, LIQUID FILLED ORAL at 17:48

## 2023-01-20 RX ADMIN — DOCUSATE SODIUM 100 MG: 100 CAPSULE, LIQUID FILLED ORAL at 08:33

## 2023-01-20 RX ADMIN — IBUPROFEN 600 MG: 600 TABLET, FILM COATED ORAL at 02:43

## 2023-01-20 RX ADMIN — IBUPROFEN 600 MG: 600 TABLET, FILM COATED ORAL at 17:48

## 2023-01-20 NOTE — LACTATION NOTE
This note was copied from a baby's chart  Met with mother to discuss feeding plan  Mother is planning on breastfeeding and bottle feeding with formula, discussing that her baby latches well with no discomfort to her during feedings  The Ready, Set, Baby Booklet was discussed  Discussed importance of skin to skin to help baby awaken for breastfeeding, to help with milk production as well as stabilize temperature, blood sugars, decrease pain, promote relaxation, and calm the baby as well as for bonding that father may do as well  Showed images of tummy size progression as milk production increases to meet the nutritional/growing needs of the baby  Discussed alternative feeding methods as a manner to provide baby with additional colostrum/breast milk if baby is sleepy and/or unable to breastfeed directly to help protect the milk supply and preserve latching abilities at the breast     Discussed “Second Night Syndrome” explaining how baby’s cluster feeds to meet growing needs  Growth spurts were explained and how cluster feeding helps boost milk supply  Explained feeding cues and fullness cues as well as importance of obtaining a deep latch for effective milk removal and proper positioning (tummy to tummy, at level, nose to nipple, bring chin to breast first and bringing baby to breast) with ear, shoulder, and hip alignment  Addressed breast pump needs and mother discussed that she would like a breast pump  After discussing options, she decided on an [employer] breast pump  Case management consult will be placed  Mother made aware of how to communicate with lactation for a latch assessment, continued support and/or questions that arise  Education and encounter occurred in [facility], mother's primary language

## 2023-01-20 NOTE — PROGRESS NOTES
Progress Note - OB/GYN  Shannan Scales 21 y o  female MRN: [id number]  Unit/Bed#: -01 Encounter: [id number]    Assessment and Plan     Shannan Scales is a patient of: [address] [place] [address]  She is PPD# 1 s/p  spontaneous vaginal delivery  Recovering well and is stable       Gestational hypertension  Assessment & Plan  Systolic (59YYZ), DEU:796 , Min:118 , FMS:651      Diastolic (08ZKX), GEQ:37, Min:59, Max:115  Diagnosed in labor    PreE Labs wnl   P:C ratio: 0 60          Anemia during pregnancy in third trimester  Assessment & Plan  Hgb 10 6 on admission    *  (spontaneous vaginal delivery)  Assessment & Plan  Admit   T&S, CBC, RPR  CLD  IV fluids  GBS prophylaxis is not needed  Induction with FB in triage, pitocin when in labor room      Disposition    - Anticipate discharge home on PPD# 2      Subjective/Objective     Chief Complaint: Postpartum State     Subjective:    Shannan Scales is PPD#1 s/p  spontaneous vaginal delivery  She has no current complaints  Pain is well controlled  Patient is currently voiding  She is ambulating  Patient is currently passing flatus and has had no bowel movement  She is tolerating PO, and denies nausea or vomitting  Patient denies fever, chills, chest pain, shortness of breath, or calf tenderness  Lochia is normal  She is  Breastfeeding  She is recovering well and is stable         Vitals:   /63   Pulse 100   Temp 98 4 °F (36 9 °C) (Oral)   Resp 20   Ht 4' 11" (1 499 m)   Wt 69 9 kg (154 lb)   LMP  (LMP Unknown)   SpO2 97%   Breastfeeding Yes   BMI 31 10 kg/m²       Intake/Output Summary (Last 24 hours) at 2023 0657  Last data filed at 2023 2321  Gross per 24 hour   Intake 1108 6 ml   Output 1462 ml   Net -353 4 ml       Invasive Devices     Peripheral Intravenous Line  Duration           Peripheral IV 23 Left;Ventral (anterior) Hand 1 day    Peripheral IV 23 Right Hand <1 day Physical Exam:   GEN: Javan Zambrano appears well, alert and oriented x 3, pleasant and cooperative   CARDIO: RRR, no murmurs or rubs  RESP:  CTAB, no wheezes or rales  ABDOMEN: soft, no tenderness, no distention, fundus @ U  EXTREMITIES:  non tender, no erythema  Labs:     Hemoglobin   Date Value Ref Range Status   01/19/2023 12 1 11 5 - 15 4 g/dL Final   01/18/2023 10 6 (L) 11 5 - 15 4 g/dL Final     WBC   Date Value Ref Range Status   01/19/2023 15 30 (H) 4 31 - 10 16 Thousand/uL Final   01/18/2023 5 77 4 31 - 10 16 Thousand/uL Final     Platelets   Date Value Ref Range Status   01/19/2023 153 149 - 390 Thousands/uL Final   01/18/2023 158 149 - 390 Thousands/uL Final     Creatinine   Date Value Ref Range Status   01/19/2023 0 59 (L) 0 60 - 1 30 mg/dL Final     Comment:     Standardized to IDMS reference method   07/24/2022 0 41 (L) 0 60 - 1 30 mg/dL Final     Comment:     Standardized to IDMS reference method     AST   Date Value Ref Range Status   01/19/2023 15 13 - 39 U/L Final     Comment:     Specimen collection should occur prior to Sulfasalazine administration due to the potential for falsely depressed results  07/24/2022 9 (L) 13 - 39 U/L Final     Comment:     Specimen collection should occur prior to Sulfasalazine administration due to the potential for falsely depressed results  ALT   Date Value Ref Range Status   01/19/2023 7 7 - 52 U/L Final     Comment:     Specimen collection should occur prior to Sulfasalazine administration due to the potential for falsely depressed results  07/24/2022 5 (L) 7 - 52 U/L Final     Comment:     Specimen collection should occur prior to Sulfasalazine administration due to the potential for falsely depressed results             Malou Strong MD  1/20/2023  6:57 AM

## 2023-01-20 NOTE — CASE MANAGEMENT
Case Management Discharge Planning Note    Patient name Robin Benavides  Location /-87 MRN [id number]  : 2005 Date 2023       Current Admission Date: 2023  Current Admission Diagnosis: (spontaneous vaginal delivery)   Patient Active Problem List    Diagnosis Date Noted   • Gestational hypertension 2023   • Anemia during pregnancy in third trimester 2022   • Need for influenza vaccination 2022   • Pyelectasis of fetus on prenatal ultrasound 2022   • Third trimester pregnancy 2022   •  (spontaneous vaginal delivery) 2022      LOS (days): 2  Geometric Mean LOS (GMLOS) (days):   Days to GMLOS:     OBJECTIVE:  Risk of Unplanned Readmission Score: 4 88         Current admission status: Inpatient   Preferred Pharmacy:   [address], [address] [facility] [address] [id number] [address]  Phone: [phone] Fax: [phone]    Primary Care Provider: No primary care provider on file      Primary Insurance: [employer]  Secondary Insurance:     DISCHARGE DETAILS:       Freedom of Choice: Yes     CM contacted family/caregiver?: Yes  Were Treatment Team discharge recommendations reviewed with patient/caregiver?: Yes  Did patient/caregiver verbalize understanding of patient care needs?: Yes  Were patient/caregiver advised of the risks associated with not following Treatment Team discharge recommendations?: Yes         [facility]         Is the patient interested in [facility] at discharge?: No    DME Referral Provided  Referral made for DME?: Yes  DME referral completed for the following items[de-identified] Other (BREAST PUMP)  DME Supplier Name[de-identified] [employer]    Other Referral/Resources/Interventions Provided:  Interventions: DME  Referral Comments: Breast pump consult; [place] order sent to [address] [place] [address] for [employer] breast pump; anticipate delivery to patient room pending insurance approval

## 2023-01-20 NOTE — PLAN OF CARE
Problem: PAIN - ADULT  Goal: Verbalizes/displays adequate comfort level or baseline comfort level  Description: Interventions:  - Encourage patient to monitor pain and request assistance  - Assess pain using appropriate pain scale  - Administer analgesics based on type and severity of pain and evaluate response  - Implement non-pharmacological measures as appropriate and evaluate response  - Consider cultural and social influences on pain and pain management  - Notify physician/advanced practitioner if interventions unsuccessful or patient reports new pain  Outcome: Progressing     Problem: INFECTION - ADULT  Goal: Absence or prevention of progression during hospitalization  Description: INTERVENTIONS:  - Assess and monitor for signs and symptoms of infection  - Monitor lab/diagnostic results  - Monitor all insertion sites, i e  indwelling lines, tubes, and drains  - Monitor endotracheal if appropriate and nasal secretions for changes in amount and color  - [place] appropriate cooling/warming therapies per order  - Administer medications as ordered  - Instruct and encourage patient and family to use good hand hygiene technique  - Identify and instruct in appropriate isolation precautions for identified infection/condition  Outcome: Progressing  Goal: Absence of fever/infection during neutropenic period  Description: INTERVENTIONS:  - Monitor WBC    Outcome: Progressing     Problem: SAFETY ADULT  Goal: Patient will remain free of falls  Description: INTERVENTIONS:  - Educate patient/family on patient safety including physical limitations  - Instruct patient to call for assistance with activity   - Consult OT/PT to assist with strengthening/mobility   - Keep Call bell within reach  - Keep bed low and locked with side rails adjusted as appropriate  - Keep care items and personal belongings within reach  - Initiate and maintain comfort rounds  - Make Fall Risk Sign visible to staff  - Offer Toileting every  Hours, in advance of need  - Initiate/Maintain alarm  - Obtain necessary fall risk management equipment:   - Apply yellow socks and bracelet for high fall risk patients  - Consider moving patient to room near nurses station  Outcome: Progressing  Goal: Maintain or return to baseline ADL function  Description: INTERVENTIONS:  -  Assess patient's ability to carry out ADLs; assess patient's baseline for ADL function and identify physical deficits which impact ability to perform ADLs (bathing, care of mouth/teeth, toileting, grooming, dressing, etc )  - Assess/evaluate cause of self-care deficits   - Assess range of motion  - Assess patient's mobility; develop plan if impaired  - Assess patient's need for assistive devices and provide as appropriate  - Encourage maximum independence but intervene and supervise when necessary  - Involve family in performance of ADLs  - Assess for home care needs following discharge   - Consider OT consult to assist with ADL evaluation and planning for discharge  - Provide patient education as appropriate  Outcome: Progressing  Goal: Maintains/Returns to pre admission functional level  Description: INTERVENTIONS:  - Perform BMAT or MOVE assessment daily    - Set and communicate daily mobility goal to care team and patient/family/caregiver  - Collaborate with rehabilitation services on mobility goals if consulted  - Perform Range of Motion  times a day  - Reposition patient every  hours    - Dangle patient  times a day  - Stand patient  times a day  - Ambulate patient  times a day  - Out of bed to chair  times a day   - Out of bed for meals  times a day  - Out of bed for toileting  - Record patient progress and toleration of activity level   Outcome: Progressing     Problem: DISCHARGE PLANNING  Goal: Discharge to home or other facility with appropriate resources  Description: INTERVENTIONS:  - Identify barriers to discharge w/patient and caregiver  - Arrange for needed discharge resources and transportation as appropriate  - Identify discharge learning needs (meds, wound care, etc )  - Arrange for interpretive services to assist at discharge as needed  - Refer to Case Management Department for coordinating discharge planning if the patient needs post-hospital services based on physician/advanced practitioner order or complex needs related to functional status, cognitive ability, or social support system  Outcome: Progressing     Problem: POSTPARTUM  Goal: Experiences normal postpartum course  Description: INTERVENTIONS:  - Monitor maternal vital signs  - Assess uterine involution and lochia  Outcome: Progressing  Goal: Appropriate maternal -  bonding  Description: INTERVENTIONS:  - Identify family support  - Assess for appropriate maternal/infant bonding   -Encourage maternal/infant bonding opportunities  - Referral to  or  as needed  Outcome: Progressing  Goal: Establishment of infant feeding pattern  Description: INTERVENTIONS:  - Assess breast/bottle feeding  - Refer to lactation as needed  Outcome: Progressing  Goal: Incision(s), wounds(s) or drain site(s) healing without S/S of infection  Description: INTERVENTIONS  - Assess and document dressing, incision, wound bed, drain sites and surrounding tissue  - Provide patient and family education  - Perform skin care/dressing changes every   Outcome: Progressing

## 2023-01-20 NOTE — PLAN OF CARE
Problem: PAIN - ADULT  Goal: Verbalizes/displays adequate comfort level or baseline comfort level  Description: Interventions:  - Encourage patient to monitor pain and request assistance  - Assess pain using appropriate pain scale  - Administer analgesics based on type and severity of pain and evaluate response  - Implement non-pharmacological measures as appropriate and evaluate response  - Consider cultural and social influences on pain and pain management  - Notify physician/advanced practitioner if interventions unsuccessful or patient reports new pain  Outcome: Progressing     Problem: INFECTION - ADULT  Goal: Absence or prevention of progression during hospitalization  Description: INTERVENTIONS:  - Assess and monitor for signs and symptoms of infection  - Monitor lab/diagnostic results  - Monitor all insertion sites, i e  indwelling lines, tubes, and drains  - Monitor endotracheal if appropriate and nasal secretions for changes in amount and color  - [place] appropriate cooling/warming therapies per order  - Administer medications as ordered  - Instruct and encourage patient and family to use good hand hygiene technique  - Identify and instruct in appropriate isolation precautions for identified infection/condition  Outcome: Progressing  Goal: Absence of fever/infection during neutropenic period  Description: INTERVENTIONS:  - Monitor WBC    Outcome: Progressing     Problem: SAFETY ADULT  Goal: Patient will remain free of falls  Description: INTERVENTIONS:  - Educate patient/family on patient safety including physical limitations  - Instruct patient to call for assistance with activity   - Consult OT/PT to assist with strengthening/mobility   - Keep Call bell within reach  - Keep bed low and locked with side rails adjusted as appropriate  - Keep care items and personal belongings within reach  - Initiate and maintain comfort rounds  - Make Fall Risk Sign visible to staff  - Offer Toileting every  Hours, in advance of need  - Initiate/Maintain alarm  - Obtain necessary fall risk management equipment:  - Apply yellow socks and bracelet for high fall risk patients  - Consider moving patient to room near nurses station  Outcome: Progressing  Goal: Maintain or return to baseline ADL function  Description: INTERVENTIONS:  -  Assess patient's ability to carry out ADLs; assess patient's baseline for ADL function and identify physical deficits which impact ability to perform ADLs (bathing, care of mouth/teeth, toileting, grooming, dressing, etc )  - Assess/evaluate cause of self-care deficits   - Assess range of motion  - Assess patient's mobility; develop plan if impaired  - Assess patient's need for assistive devices and provide as appropriate  - Encourage maximum independence but intervene and supervise when necessary  - Involve family in performance of ADLs  - Assess for home care needs following discharge   - Consider OT consult to assist with ADL evaluation and planning for discharge  - Provide patient education as appropriate  Outcome: Progressing  Goal: Maintains/Returns to pre admission functional level  Description: INTERVENTIONS:  - Perform BMAT or MOVE assessment daily    - Set and communicate daily mobility goal to care team and patient/family/caregiver  - Collaborate with rehabilitation services on mobility goals if consulted  - Perform Range of Motion  times a day  - Reposition patient every  hours    - Dangle patient times a day  - Stand patient  times a day  - Ambulate patient times a day  - Out of bed to chair  times a day   - Out of bed for meals  times a day  - Out of bed for toileting  - Record patient progress and toleration of activity level   Outcome: Progressing     Problem: Knowledge Deficit  Goal: Patient/family/caregiver demonstrates understanding of disease process, treatment plan, medications, and discharge instructions  Description: Complete learning assessment and assess knowledge base   Interventions:  - Provide teaching at level of understanding  - Provide teaching via preferred learning methods  Outcome: Completed  Goal: Verbalizes understanding of labor plan  Description: Assess patient/family/caregiver's baseline knowledge level and ability to understand information  Provide education via patient/family/caregiver's preferred learning method at appropriate level of understanding  1  Provide teaching at level of understanding  2  Provide teaching via preferred learning method(s)  Outcome: Completed     Problem: DISCHARGE PLANNING  Goal: Discharge to home or other facility with appropriate resources  Description: INTERVENTIONS:  - Identify barriers to discharge w/patient and caregiver  - Arrange for needed discharge resources and transportation as appropriate  - Identify discharge learning needs (meds, wound care, etc )  - Arrange for interpretive services to assist at discharge as needed  - Refer to Case Management Department for coordinating discharge planning if the patient needs post-hospital services based on physician/advanced practitioner order or complex needs related to functional status, cognitive ability, or social support system  Outcome: Progressing     Problem: Labor & Delivery  Goal: Manages discomfort  Description: Assess and monitor for signs and symptoms of discomfort  Assess patient's pain level regularly and per hospital policy  Administer medications as ordered  Support use of nonpharmacological methods to help control pain such as distraction, imagery, relaxation, and application of heat and cold  Collaborate with interdisciplinary team and patient to determine appropriate pain management plan  1  Include patient in decisions related to comfort  2  Offer non-pharmacological pain management interventions  3  Report ineffective pain management to physician  Outcome: Completed  Goal: Patient vital signs are stable  Description: 1   Assess vital signs - vaginal delivery    Outcome: Completed

## 2023-01-20 NOTE — ASSESSMENT & PLAN NOTE
Systolic (98QMN), ARL:880 , Min:120 , PPX:197    Diastolic (19KDW), JGE:33, Min:73, Max:89  Diagnosed in labor  PreE Labs wnl   P:C ratio: 0 60

## 2023-01-21 VITALS
TEMPERATURE: 98 F | RESPIRATION RATE: 18 BRPM | SYSTOLIC BLOOD PRESSURE: 134 MMHG | DIASTOLIC BLOOD PRESSURE: 81 MMHG | HEIGHT: 59 IN | WEIGHT: 154 LBS | BODY MASS INDEX: 31.04 KG/M2 | HEART RATE: 82 BPM | OXYGEN SATURATION: 96 %

## 2023-01-21 PROCEDURE — 0JHF3HZ INSERTION OF CONTRACEPTIVE DEVICE INTO LEFT UPPER ARM SUBCUTANEOUS TISSUE AND FASCIA, PERCUTANEOUS APPROACH: ICD-10-PCS | Performed by: OBSTETRICS & GYNECOLOGY

## 2023-01-21 RX ORDER — LIDOCAINE HYDROCHLORIDE 10 MG/ML
2 INJECTION, SOLUTION EPIDURAL; INFILTRATION; INTRACAUDAL; PERINEURAL ONCE
Status: COMPLETED | OUTPATIENT
Start: 2023-01-21 | End: 2023-01-21

## 2023-01-21 RX ORDER — IBUPROFEN 600 MG/1
600 TABLET ORAL EVERY 6 HOURS PRN
Qty: 30 TABLET | Refills: 0 | Status: SHIPPED | OUTPATIENT
Start: 2023-01-21

## 2023-01-21 RX ORDER — ACETAMINOPHEN 325 MG/1
650 TABLET ORAL EVERY 6 HOURS PRN
Refills: 0
Start: 2023-01-21

## 2023-01-21 RX ADMIN — IBUPROFEN 600 MG: 600 TABLET, FILM COATED ORAL at 06:09

## 2023-01-21 RX ADMIN — LIDOCAINE HYDROCHLORIDE 2 ML: 10 INJECTION, SOLUTION EPIDURAL; INFILTRATION; INTRACAUDAL; PERINEURAL at 10:07

## 2023-01-21 RX ADMIN — DOCUSATE SODIUM 100 MG: 100 CAPSULE, LIQUID FILLED ORAL at 08:14

## 2023-01-21 RX ADMIN — ETONOGESTREL 68 MG: 68 IMPLANT SUBCUTANEOUS at 10:09

## 2023-01-21 RX ADMIN — IBUPROFEN 600 MG: 600 TABLET, FILM COATED ORAL at 00:20

## 2023-01-21 NOTE — PLAN OF CARE
Problem: PAIN - ADULT  Goal: Verbalizes/displays adequate comfort level or baseline comfort level  Description: Interventions:  - Encourage patient to monitor pain and request assistance  - Assess pain using appropriate pain scale  - Administer analgesics based on type and severity of pain and evaluate response  - Implement non-pharmacological measures as appropriate and evaluate response  - Consider cultural and social influences on pain and pain management  - Notify physician/advanced practitioner if interventions unsuccessful or patient reports new pain  Outcome: Progressing     Problem: INFECTION - ADULT  Goal: Absence or prevention of progression during hospitalization  Description: INTERVENTIONS:  - Assess and monitor for signs and symptoms of infection  - Monitor lab/diagnostic results  - Monitor all insertion sites, i e  indwelling lines, tubes, and drains  - Monitor endotracheal if appropriate and nasal secretions for changes in amount and color  - [place] appropriate cooling/warming therapies per order  - Administer medications as ordered  - Instruct and encourage patient and family to use good hand hygiene technique  - Identify and instruct in appropriate isolation precautions for identified infection/condition  Outcome: Progressing  Goal: Absence of fever/infection during neutropenic period  Description: INTERVENTIONS:  - Monitor WBC    Outcome: Progressing     Problem: SAFETY ADULT  Goal: Patient will remain free of falls  Description: INTERVENTIONS:  - Educate patient/family on patient safety including physical limitations  - Instruct patient to call for assistance with activity   - Consult OT/PT to assist with strengthening/mobility   - Keep Call bell within reach  - Keep bed low and locked with side rails adjusted as appropriate  - Keep care items and personal belongings within reach  - Initiate and maintain comfort rounds  - Make Fall Risk Sign visible to staff  - Offer Toileting every  Hours, in advance of need  - Initiate/Maintain alarm  - Obtain necessary fall risk management equipment:   - Apply yellow socks and bracelet for high fall risk patients  - Consider moving patient to room near nurses station  Outcome: Progressing  Goal: Maintain or return to baseline ADL function  Description: INTERVENTIONS:  -  Assess patient's ability to carry out ADLs; assess patient's baseline for ADL function and identify physical deficits which impact ability to perform ADLs (bathing, care of mouth/teeth, toileting, grooming, dressing, etc )  - Assess/evaluate cause of self-care deficits   - Assess range of motion  - Assess patient's mobility; develop plan if impaired  - Assess patient's need for assistive devices and provide as appropriate  - Encourage maximum independence but intervene and supervise when necessary  - Involve family in performance of ADLs  - Assess for home care needs following discharge   - Consider OT consult to assist with ADL evaluation and planning for discharge  - Provide patient education as appropriate  Outcome: Progressing  Goal: Maintains/Returns to pre admission functional level  Description: INTERVENTIONS:  - Perform BMAT or MOVE assessment daily    - Set and communicate daily mobility goal to care team and patient/family/caregiver  - Collaborate with rehabilitation services on mobility goals if consulted  - Perform Range of Motion times a day  - Reposition patient every  hours    - Dangle patient  times a day  - Stand patient times a day  - Ambulate patient  times a day  - Out of bed to chair  times a day   - Out of bed for meals  times a day  - Out of bed for toileting  - Record patient progress and toleration of activity level   Outcome: Progressing     Problem: DISCHARGE PLANNING  Goal: Discharge to home or other facility with appropriate resources  Description: INTERVENTIONS:  - Identify barriers to discharge w/patient and caregiver  - Arrange for needed discharge resources and transportation as appropriate  - Identify discharge learning needs (meds, wound care, etc )  - Arrange for interpretive services to assist at discharge as needed  - Refer to Case Management Department for coordinating discharge planning if the patient needs post-hospital services based on physician/advanced practitioner order or complex needs related to functional status, cognitive ability, or social support system  Outcome: Progressing     Problem: POSTPARTUM  Goal: Experiences normal postpartum course  Description: INTERVENTIONS:  - Monitor maternal vital signs  - Assess uterine involution and lochia  Outcome: Progressing  Goal: Appropriate maternal -  bonding  Description: INTERVENTIONS:  - Identify family support  - Assess for appropriate maternal/infant bonding   -Encourage maternal/infant bonding opportunities  - Referral to  or  as needed  Outcome: Progressing  Goal: Establishment of infant feeding pattern  Description: INTERVENTIONS:  - Assess breast/bottle feeding  - Refer to lactation as needed  Outcome: Progressing  Goal: Incision(s), wounds(s) or drain site(s) healing without S/S of infection  Description: INTERVENTIONS  - Assess and document dressing, incision, wound bed, drain sites and surrounding tissue  - Provide patient and family education  - Perform skin care/dressing changes every  Outcome: Progressing

## 2023-01-21 NOTE — PLAN OF CARE
Problem: PAIN - ADULT  Goal: Verbalizes/displays adequate comfort level or baseline comfort level  Description: Interventions:  - Encourage patient to monitor pain and request assistance  - Assess pain using appropriate pain scale  - Administer analgesics based on type and severity of pain and evaluate response  - Implement non-pharmacological measures as appropriate and evaluate response  - Consider cultural and social influences on pain and pain management  - Notify physician/advanced practitioner if interventions unsuccessful or patient reports new pain  Outcome: Adequate for Discharge     Problem: INFECTION - ADULT  Goal: Absence or prevention of progression during hospitalization  Description: INTERVENTIONS:  - Assess and monitor for signs and symptoms of infection  - Monitor lab/diagnostic results  - Monitor all insertion sites, i e  indwelling lines, tubes, and drains  - Monitor endotracheal if appropriate and nasal secretions for changes in amount and color  - [place] appropriate cooling/warming therapies per order  - Administer medications as ordered  - Instruct and encourage patient and family to use good hand hygiene technique  - Identify and instruct in appropriate isolation precautions for identified infection/condition  Outcome: Adequate for Discharge  Goal: Absence of fever/infection during neutropenic period  Description: INTERVENTIONS:  - Monitor WBC    Outcome: Adequate for Discharge     Problem: SAFETY ADULT  Goal: Patient will remain free of falls  Description: INTERVENTIONS:  - Educate patient/family on patient safety including physical limitations  - Instruct patient to call for assistance with activity   - Consult OT/PT to assist with strengthening/mobility   - Keep Call bell within reach  - Keep bed low and locked with side rails adjusted as appropriate  - Keep care items and personal belongings within reach  - Initiate and maintain comfort rounds  - Make Fall Risk Sign visible to staff  - Apply yellow socks and bracelet for high fall risk patients  - Consider moving patient to room near nurses station  Outcome: Adequate for Discharge  Goal: Maintain or return to baseline ADL function  Description: INTERVENTIONS:  -  Assess patient's ability to carry out ADLs; assess patient's baseline for ADL function and identify physical deficits which impact ability to perform ADLs (bathing, care of mouth/teeth, toileting, grooming, dressing, etc )  - Assess/evaluate cause of self-care deficits   - Assess range of motion  - Assess patient's mobility; develop plan if impaired  - Assess patient's need for assistive devices and provide as appropriate  - Encourage maximum independence but intervene and supervise when necessary  - Involve family in performance of ADLs  - Assess for home care needs following discharge   - Consider OT consult to assist with ADL evaluation and planning for discharge  - Provide patient education as appropriate  Outcome: Adequate for Discharge  Goal: Maintains/Returns to pre admission functional level  Description: INTERVENTIONS:  - Perform BMAT or MOVE assessment daily    - Set and communicate daily mobility goal to care team and patient/family/caregiver     - Collaborate with rehabilitation services on mobility goals if consulted  - Out of bed for toileting  - Record patient progress and toleration of activity level   Outcome: Adequate for Discharge     Problem: DISCHARGE PLANNING  Goal: Discharge to home or other facility with appropriate resources  Description: INTERVENTIONS:  - Identify barriers to discharge w/patient and caregiver  - Arrange for needed discharge resources and transportation as appropriate  - Identify discharge learning needs (meds, wound care, etc )  - Arrange for interpretive services to assist at discharge as needed  - Refer to Case Management Department for coordinating discharge planning if the patient needs post-hospital services based on physician/advanced practitioner order or complex needs related to functional status, cognitive ability, or social support system  Outcome: Adequate for Discharge     Problem: POSTPARTUM  Goal: Experiences normal postpartum course  Description: INTERVENTIONS:  - Monitor maternal vital signs  - Assess uterine involution and lochia  Outcome: Adequate for Discharge  Goal: Appropriate maternal -  bonding  Description: INTERVENTIONS:  - Identify family support  - Assess for appropriate maternal/infant bonding   -Encourage maternal/infant bonding opportunities  - Referral to  or  as needed  Outcome: Adequate for Discharge  Goal: Establishment of infant feeding pattern  Description: INTERVENTIONS:  - Assess breast/bottle feeding  - Refer to lactation as needed  Outcome: Adequate for Discharge  Goal: Incision(s), wounds(s) or drain site(s) healing without S/S of infection  Description: INTERVENTIONS  - Assess and document dressing, incision, wound bed, drain sites and surrounding tissue  - Provide patient and family education  Outcome: Adequate for Discharge

## 2023-01-21 NOTE — CASE MANAGEMENT
Case Management Discharge Planning Note    Patient name Edgardo Funk  Location /-30 MRN [id number]  : 2005 Date 2023       Current Admission Date: 2023  Current Admission Diagnosis: (spontaneous vaginal delivery)   Patient Active Problem List    Diagnosis Date Noted   • Gestational hypertension 2023   • Anemia during pregnancy in third trimester 2022   • Need for influenza vaccination 2022   • Pyelectasis of fetus on prenatal ultrasound 2022   • Third trimester pregnancy 2022   •  (spontaneous vaginal delivery) 2022      LOS (days): 3  Geometric Mean LOS (GMLOS) (days):   Days to GMLOS:     OBJECTIVE:  Risk of Unplanned Readmission Score: 4 93         Current admission status: Inpatient   Preferred Pharmacy:   [address] [place] [address] [address] [address] [address]  Phone: [phone] Fax: [phone]    Primary Care Provider: No primary care provider on file  Primary Insurance: [employer]  Secondary Insurance:     DISCHARGE DETAILS:  CM delivered Ameda breast pump to MOB at bedside  MOB signed delivery ticket and provided copy at the bedside  Cleared from CM needs

## 2023-01-21 NOTE — PROGRESS NOTES
Progress Note - OB/GYN  Shannan Waddell 21 y o  female MRN: [id number]  Unit/Bed#:  315-01 Encounter: [id number]    Assessment and Plan     Shannan Waddell is a patient of: [address] [place] [address]  She is PPD# 2 s/p  spontaneous vaginal delivery  Recovering well and is stable       Gestational hypertension  Assessment & Plan  Systolic (12BOQ), DPV:760 , Min:120 , ADQ:785    Diastolic (56GBC), YNP:76, Min:73, Max:89  Diagnosed in labor  PreE Labs wnl   P:C ratio: 0 60      Anemia during pregnancy in third trimester  Assessment & Plan  Hgb 10 6 on admission    *  (spontaneous vaginal delivery)  Assessment & Plan  Continue postpartum care  Encourage breastfeeding  Contraception: Nexplanon      Disposition    - Anticipate discharge home today      Subjective/Objective     Chief Complaint: Postpartum State     Subjective:    Shannan Waddell is PPD#2 s/p  spontaneous vaginal delivery  She has no current complaints  Pain is well controlled  Patient is currently voiding  She is ambulating  Patient is currently passing flatus and has had bowel movement  She is tolerating PO, and denies nausea or vomitting  Patient denies fever, chills, chest pain, shortness of breath, or calf tenderness  Lochia is normal  She is  Breastfeeding  She is recovering well and is stable       Vitals:   /81 (BP Location: Right arm)   Pulse 82   Temp 98 °F (36 7 °C) (Oral)   Resp 18   Ht 4' 11" (1 499 m)   Wt 69 9 kg (154 lb)   LMP  (LMP Unknown)   SpO2 96%   Breastfeeding Yes   BMI 31 10 kg/m²     No intake or output data in the 24 hours ending 23 0909    Invasive Devices     None                 Physical Exam:   GEN: Georgina Grimes appears well, alert and oriented x 3, pleasant and cooperative   CARDIO: RRR, no murmurs or rubs  RESP:  CTAB, no wheezes or rales  ABDOMEN: soft, no tenderness, no distention, fundus @ U-12  EXTREMITIES:  non tender, no erythema  Labs:     Hemoglobin Date Value Ref Range Status   01/19/2023 12 1 11 5 - 15 4 g/dL Final   01/18/2023 10 6 (L) 11 5 - 15 4 g/dL Final     WBC   Date Value Ref Range Status   01/19/2023 15 30 (H) 4 31 - 10 16 Thousand/uL Final   01/18/2023 5 77 4 31 - 10 16 Thousand/uL Final     Platelets   Date Value Ref Range Status   01/19/2023 153 149 - 390 Thousands/uL Final   01/18/2023 158 149 - 390 Thousands/uL Final     Creatinine   Date Value Ref Range Status   01/19/2023 0 59 (L) 0 60 - 1 30 mg/dL Final     Comment:     Standardized to IDMS reference method   07/24/2022 0 41 (L) 0 60 - 1 30 mg/dL Final     Comment:     Standardized to IDMS reference method     AST   Date Value Ref Range Status   01/19/2023 15 13 - 39 U/L Final     Comment:     Specimen collection should occur prior to Sulfasalazine administration due to the potential for falsely depressed results  07/24/2022 9 (L) 13 - 39 U/L Final     Comment:     Specimen collection should occur prior to Sulfasalazine administration due to the potential for falsely depressed results  ALT   Date Value Ref Range Status   01/19/2023 7 7 - 52 U/L Final     Comment:     Specimen collection should occur prior to Sulfasalazine administration due to the potential for falsely depressed results  07/24/2022 5 (L) 7 - 52 U/L Final     Comment:     Specimen collection should occur prior to Sulfasalazine administration due to the potential for falsely depressed results             Jarvis Marshall MD  1/21/2023  9:09 AM

## 2023-01-23 NOTE — UTILIZATION REVIEW
NOTIFICATION OF INPATIENT ADMISSION   MATERNITY/DELIVERY AUTHORIZATION REQUEST   SERVICING FACILITY:   [facility] - [facility], , NICU  [address] [place]  Colleen Rodriguez, [address] [place] [address]  Tax ID: [id number]  NPI: [id number]   ATTENDING PROVIDER:  Attending Name and NPI#: Chao Julian Md [[id number]]  Address: [address] [place], [place] [address]on [facility]  Colleen Rodriguez, [address] [place] [address]  Phone: [phone]   ADMISSION INFORMATION:  Place of Service: Inpatient [address] [facility]  [address]  Place of Service Code: 21  Inpatient Admission Date/Time: 23  4:03 PM  Discharge Date/Time: 2023 12:51 PM  Admitting Diagnosis Code/Description:  Encounter for induction of labor [Z34 90]  44 weeks gestation of pregnancy [Z3A 39]  Encounter for full-term uncomplicated delivery [X00]     Mother: Brissa Robles 2005 Estimated Date of Delivery: 23  Delivering clinician: Chao Julian    OB History        1    Para   1    Term   1            AB        Living   1       SAB        IAB        Ectopic        Multiple   0    Live Births   1               [place] Name & MRN:   Information for the patient's :  Andria Santiago [[id number]]      Delivery Information:  Sex: female  Delivered 2023 1:42 PM by Vaginal, Spontaneous; Gestational Age: 36w3d    [place] Measurements:  Weight: 7 lb 5 3 oz (3325 g); Height: 19 5"    APGAR 1 minute 5 minutes 10 minutes   Totals: 7 9       Birth Information: 21 y o  female MRN: [id number] Unit/Bed#: -01   Birthweight: No birth weight on file  Gestational Age: <None> Delivery Type:    APGARS Totals:        UTILIZATION REVIEW CONTACT:  Yue Deleon Utilization   Network Utilization Review Department  Phone: [phone]  Fax [phone]  Email: [email or web address]  Contact for approvals/pending authorizations, clinical reviews, and discharge       PHYSICIAN ADVISORY SERVICES:  Medical Necessity Denial & Chov-tb-Vdza Review  Phone: [phone]  Fax: [phone]  Email: [email or web address]

## 2023-01-24 ENCOUNTER — TELEPHONE (OUTPATIENT)
Dept: OBGYN CLINIC | Facility: CLINIC | Age: 18
End: 2023-01-24

## 2023-01-24 NOTE — TELEPHONE ENCOUNTER
Transition of Care Post Partum phone call: Congratulations      Date of delivery: 2023  Weeks gestation: full term 39 weeks  Type of delivery: vaginal delivery  Sex of child: female  Name of child:   Birth weight: 3325 gm   7lbs 5ounces  Perineum laceration: Yes   Pediatric provider:  Vaginal bleeding status:moderate   Urinary status: voiding WNL   Bowel movement: Yes  Incision status: NA   Pain control: ibuprofen (OTC)   feeding: Both   Any baby blues: No  Scheduled for follow-up appointment: 2023

## 2023-01-27 PROCEDURE — T1002 RN SERVICES UP TO 15 MINUTES: HCPCS

## 2023-02-08 PROBLEM — O99.013 ANEMIA DURING PREGNANCY IN THIRD TRIMESTER: Status: RESOLVED | Noted: 2022-12-18 | Resolved: 2023-02-08

## 2023-02-08 PROBLEM — Z34.93 THIRD TRIMESTER PREGNANCY: Status: RESOLVED | Noted: 2022-08-11 | Resolved: 2023-02-08

## 2023-02-08 LAB
DME PARACHUTE DELIVERY DATE ACTUAL: NORMAL
DME PARACHUTE DELIVERY DATE REQUESTED: NORMAL
DME PARACHUTE ITEM DESCRIPTION: NORMAL
DME PARACHUTE ORDER STATUS: NORMAL
DME PARACHUTE SUPPLIER NAME: NORMAL
DME PARACHUTE SUPPLIER PHONE: NORMAL

## 2023-02-15 ENCOUNTER — POSTPARTUM VISIT (OUTPATIENT)
Dept: OBGYN CLINIC | Facility: CLINIC | Age: 18
End: 2023-02-15

## 2023-02-15 VITALS
HEART RATE: 65 BPM | DIASTOLIC BLOOD PRESSURE: 87 MMHG | SYSTOLIC BLOOD PRESSURE: 121 MMHG | BODY MASS INDEX: 23.95 KG/M2 | WEIGHT: 118.6 LBS

## 2023-02-15 DIAGNOSIS — Z97.5 NEXPLANON IN PLACE: ICD-10-CM

## 2023-02-15 NOTE — PROGRESS NOTES
Subjective     Shannan Monte is a 21 y o  y o  female  who presents for a postpartum visit  She is 3 weeks postpartum following a spontaneous vaginal delivery on, she had a baby girl " Favian Montanez" that was weighed 7 pounds 5 3 ounces, Apgars were 7 and 9  Her pregnancy was complicated by pyelectasis of fetus on prenatal ultrasound, anemia in pregnancy  She had a second-degree laceration  There was shoulder dystocia resolved with Shraddha maneuver  During labor she was diagnosed with gestational hypertension  She denies any HA's, visual changes,SOB or pain in extremities  Has lost 35 lbs since delivery  Reports eating and drinking  Well, lives with her mother    I have fully reviewed the prenatal and intrapartum course  The delivery was at 39 wk 1d gestational weeks  Outcome: spontaneous vaginal delivery  Anesthesia: epidural  Postpartum course has been uneventful  Baby's course has been uneventful  Baby is feeding by both breast and bottle - Similac Advance  Bleeding no bleeding  Bowel function is normal  Bladder function is normal  Patient is sexually active  Contraception method is nexplanon  Postpartum depression screening: negative  Scored a 0  Has help with FOB and her mother  The following portions of the patient's history were reviewed and updated as appropriate: allergies, current medications, past family history, past medical history, past social history, past surgical history and problem list     Review of Systems  Pertinent items are noted in HPI       Objective     /87   Pulse 65   Wt 53 8 kg (118 lb 9 6 oz)   BMI 23 95 kg/m²     General:   appears stated age, cooperative, alert normal mood and affect   Neck: Neck: normal, supple,trachea midline, no masses   Heart: regular rate and rhythm, S1, S2 normal, no murmur, click, rub or gallop   Lungs: clear to auscultation bilaterally   Breasts: Deferred denied concerns   Abdomen: soft, non-tender, without masses or organomegaly   Vulva: normal female genitalia   Vagina: normal vagina, no discharge, exudate, lesion, or erythema, 2 degree laceration healing well  Urethra: normal   Cervix: deferred   Uterus:    Adnexa:      Assessment/Plan     3 wks postpartum exam  Pap smear not done at today's visit  2nd degree laceration  1  Contraception: nexplanon , was placed after delivery, palpable in Left upper arm, denies concerns  2  Weight loss, pt has a scale at home- reviewed to call if continues to lose weight  RTO in 2 months for f/u   3  Follow up in: 2 months or as needed  4  Continue vitamins until finished

## 2024-06-24 ENCOUNTER — APPOINTMENT (EMERGENCY)
Dept: RADIOLOGY | Facility: HOSPITAL | Age: 19
End: 2024-06-24
Payer: COMMERCIAL

## 2024-06-24 ENCOUNTER — HOSPITAL ENCOUNTER (EMERGENCY)
Facility: HOSPITAL | Age: 19
Discharge: HOME | End: 2024-06-24
Attending: EMERGENCY MEDICINE
Payer: COMMERCIAL

## 2024-06-24 VITALS
DIASTOLIC BLOOD PRESSURE: 68 MMHG | HEART RATE: 79 BPM | RESPIRATION RATE: 18 BRPM | OXYGEN SATURATION: 99 % | TEMPERATURE: 98.2 F | SYSTOLIC BLOOD PRESSURE: 95 MMHG

## 2024-06-24 DIAGNOSIS — M79.602 LEFT ARM PAIN: Primary | ICD-10-CM

## 2024-06-24 PROCEDURE — 96372 THER/PROPH/DIAG INJ SC/IM: CPT

## 2024-06-24 PROCEDURE — 73060 X-RAY EXAM OF HUMERUS: CPT

## 2024-06-24 PROCEDURE — 99283 EMERGENCY DEPT VISIT LOW MDM: CPT

## 2024-06-24 PROCEDURE — 99284 EMERGENCY DEPT VISIT MOD MDM: CPT | Performed by: EMERGENCY MEDICINE

## 2024-06-24 RX ORDER — IBUPROFEN 600 MG/1
600 TABLET ORAL EVERY 6 HOURS PRN
Qty: 30 TABLET | Refills: 0 | Status: SHIPPED | OUTPATIENT
Start: 2024-06-24

## 2024-06-24 RX ORDER — KETOROLAC TROMETHAMINE 30 MG/ML
30 INJECTION, SOLUTION INTRAMUSCULAR; INTRAVENOUS ONCE
Status: COMPLETED | OUTPATIENT
Start: 2024-06-24 | End: 2024-06-24

## 2024-06-24 RX ADMIN — KETOROLAC TROMETHAMINE 30 MG: 30 INJECTION, SOLUTION INTRAMUSCULAR at 08:40

## 2024-06-24 NOTE — DISCHARGE INSTRUCTIONS
Follow-up with your PCP and OB/GYN for continued symptoms.  Return to the emergency department for new or worsening symptoms

## 2024-06-24 NOTE — ED PROVIDER NOTES
History  Chief Complaint   Patient presents with    Arm Pain     Pt reports having an implant in my left arm and hurt it yetserday, reports having Nexplanon implanted.      19-year-old female presents to the emergency department for evaluation of arm pain.  The patient is primarily Armenian-speaking and an  was used for translation.  Patient reports hitting her upper left arm against a toy yesterday and has continued to have left upper arm pain.  Patient states that she has an implant in that arm and was concerned that this could be causing her pain so came to the emergency department for further evaluation.  She has not been taking any medications to treat her symptoms.  No localized numbness, tingling or weakness.        Prior to Admission Medications   Prescriptions Last Dose Informant Patient Reported? Taking?   Prenatal Vit-Fe Fumarate-FA (M-Tonie Plus) 27-1 MG TABS   No No   Sig: TAKE 1 TABLET BY MOUTH EVERY DAY IN THE MORNING   acetaminophen (TYLENOL) 325 mg tablet  Self No No   Sig: Take 2 tablets (650 mg total) by mouth every 6 (six) hours as needed for mild pain   Patient not taking: Reported on 2023   docusate sodium (COLACE) 100 mg capsule   No No   Sig: Take 1 capsule (100 mg total) by mouth 2 (two) times a day   ferrous sulfate 324 (65 Fe) mg  Self No No   Sig: TAKE 1 TABLET BY MOUTH 2 TIMES A DAY BEFORE MEALS   ibuprofen (MOTRIN) 600 mg tablet  Self No No   Sig: Take 1 tablet (600 mg total) by mouth every 6 (six) hours as needed for moderate pain   Patient not taking: Reported on 2/15/2023      Facility-Administered Medications: None       Past Medical History:   Diagnosis Date    Anemia during pregnancy in third trimester 2022       History reviewed. No pertinent surgical history.    Family History   Problem Relation Age of Onset    Thyroid disease Mother     No Known Problems Sister     No Known Problems Brother     No Known Problems Brother     No Known Problems Brother      Heart disease Maternal Grandfather     No Known Problems Paternal Grandmother     No Known Problems Paternal Grandfather     Thyroid disease Maternal Aunt     Thyroid disease Maternal Aunt      I have reviewed and agree with the history as documented.    E-Cigarette/Vaping    E-Cigarette Use Former User      E-Cigarette/Vaping Substances    Nicotine No     THC No     CBD No     Flavoring No     Other No     Unknown No      Social History     Tobacco Use    Smoking status: Never    Smokeless tobacco: Never   Vaping Use    Vaping status: Former   Substance Use Topics    Alcohol use: Not Currently    Drug use: Never       Review of Systems   Constitutional:  Negative for chills and fever.   HENT:  Negative for ear pain and sore throat.    Eyes:  Negative for pain and visual disturbance.   Respiratory:  Negative for cough and shortness of breath.    Cardiovascular:  Negative for chest pain and palpitations.   Gastrointestinal:  Negative for abdominal pain and vomiting.   Genitourinary:  Negative for dysuria and hematuria.   Musculoskeletal:  Negative for arthralgias and back pain.   Skin:  Negative for color change and rash.   Neurological:  Negative for seizures and syncope.   All other systems reviewed and are negative.      Physical Exam  Physical Exam  Vitals and nursing note reviewed.   Constitutional:       General: She is not in acute distress.     Appearance: She is well-developed.   HENT:      Head: Normocephalic and atraumatic.   Eyes:      Conjunctiva/sclera: Conjunctivae normal.   Cardiovascular:      Rate and Rhythm: Normal rate and regular rhythm.      Pulses:           Radial pulses are 2+ on the right side and 2+ on the left side.        Dorsalis pedis pulses are 2+ on the right side and 2+ on the left side.      Heart sounds: No murmur heard.  Pulmonary:      Effort: Pulmonary effort is normal. No respiratory distress.      Breath sounds: Normal breath sounds.   Abdominal:      Palpations: Abdomen is  soft.      Tenderness: There is no abdominal tenderness.   Musculoskeletal:         General: Tenderness present. No swelling.      Right shoulder: Normal.      Left shoulder: Normal.      Left upper arm: Tenderness present.      Right wrist: Normal.      Left wrist: Normal.        Arms:       Cervical back: Neck supple.      Right lower leg: No edema.      Left lower leg: No edema.      Comments: Distal upper extremity pulse, motor and sensation intact and symmetric bilaterally   Skin:     General: Skin is warm and dry.      Capillary Refill: Capillary refill takes less than 2 seconds.   Neurological:      Mental Status: She is alert.   Psychiatric:         Mood and Affect: Mood normal.         Vital Signs  ED Triage Vitals   Temperature Pulse Respirations Blood Pressure SpO2   06/24/24 0837 06/24/24 0831 06/24/24 0831 06/24/24 0831 06/24/24 0831   98.2 °F (36.8 °C) 79 18 95/68 99 %      Temp Source Heart Rate Source Patient Position - Orthostatic VS BP Location FiO2 (%)   06/24/24 0837 06/24/24 0831 -- -- --   Oral Monitor         Pain Score       06/24/24 0840       6           Vitals:    06/24/24 0831   BP: 95/68   Pulse: 79         Visual Acuity      ED Medications  Medications   ketorolac (TORADOL) injection 30 mg (30 mg Intramuscular Given 6/24/24 0840)       Diagnostic Studies  Results Reviewed       None                   XR humerus LEFT   ED Interpretation by Angelo Goodman MD (06/24 0858)   No acute fracture or dislocation.  Implant in place.                 Procedures  Procedures         ED Course                               SBIRT 20yo+      Flowsheet Row Most Recent Value   Initial Alcohol Screen: US AUDIT-C     1. How often do you have a drink containing alcohol? 0 Filed at: 06/24/2024 0833   2. How many drinks containing alcohol do you have on a typical day you are drinking?  0 Filed at: 06/24/2024 0833   3a. Male UNDER 65: How often do you have five or more drinks on one occasion? 0 Filed at:  06/24/2024 0833   3b. FEMALE Any Age, or MALE 65+: How often do you have 4 or more drinks on one occassion? 0 Filed at: 06/24/2024 0833   Audit-C Score 0 Filed at: 06/24/2024 0833   AURELIA: How many times in the past year have you...    Used an illegal drug or used a prescription medication for non-medical reasons? Never Filed at: 06/24/2024 0833                      Medical Decision Making  19-year-old female presented to the emergency department for evaluation of arm pain.  On arrival patient was awake, alert, oriented and in no acute distress.  Vital signs unremarkable.  On exam patient had tenderness to palpation of her left upper arm.  Physical exam was otherwise unremarkable.  Patient neurovascularly intact.  Imaging done in the emergency department on my interpretation with no acute fracture or dislocation.  Patient treated symptomatically with Toradol.  On reevaluation patient reported improvement of symptoms.  All diagnostic studies were discussed with the patient in detail.  She is appropriate for discharge.  Recommendation was made for the patient to follow-up with her PCP and OB/GYN for continued symptoms.  Return precautions were discussed.    Patient agrees with the plan for discharge and feels comfortable to go home with proper f/u. Advised to return for worsening or additional problems. Diagnostic tests were reviewed and questions answered. Diagnosis, care plan and treatment options were discussed. The patient understands instructions and will follow up as directed.        Amount and/or Complexity of Data Reviewed  Radiology: ordered.    Risk  Prescription drug management.             Disposition  Final diagnoses:   Left arm pain     Time reflects when diagnosis was documented in both MDM as applicable and the Disposition within this note       Time User Action Codes Description Comment    6/24/2024  8:57 AM Angelo Goodman Add [M79.602] Left arm pain           ED Disposition       ED Disposition    Discharge    Condition   Stable    Date/Time    0857    Comment   Shannan Enciso discharge to home/self care.                   Follow-up Information       Follow up With Specialties Details Why Contact Info Additional Information    [facility] Emergency Department Emergency Medicine Go to  If symptoms worsen [address] [address]  [phone] [facility] Emergency Department, [address] [address]            Discharge Medication List as of 2024  9:01 AM        CONTINUE these medications which have CHANGED    Details   ibuprofen (MOTRIN) 600 mg tablet Take 1 tablet (600 mg total) by mouth every 6 (six) hours as needed for mild pain or moderate pain, Starting Mon 2024, Normal           CONTINUE these medications which have NOT CHANGED    Details   acetaminophen (TYLENOL) 325 mg tablet Take 2 tablets (650 mg total) by mouth every 6 (six) hours as needed for mild pain, Starting Sat 2023, No Print      docusate sodium (COLACE) 100 mg capsule Take 1 capsule (100 mg total) by mouth 2 (two) times a day, Starting Thu 10/27/2022, Until Thu 2022, Normal      ferrous sulfate 324 (65 Fe) mg TAKE 1 TABLET BY MOUTH 2 TIMES A DAY BEFORE MEALS, Normal      Prenatal Vit-Fe Fumarate-FA (M- Plus) 27-1 MG TABS TAKE 1 TABLET BY MOUTH EVERY DAY IN THE MORNING, Normal             No discharge procedures on file.    PDMP Review       None            ED Provider  Electronically Signed by             Angelo Goodman MD  24 [id number]

## 2024-06-24 NOTE — Clinical Note
Shannan Enciso was seen and treated in our emergency department on 6/24/2024.    No restrictions            Diagnosis: Arm pain    Shannan  may return to work on return date.    She may return on this date: 06/25/2024         If you have any questions or concerns, please don't hesitate to call.      Angelo Goodman MD    ______________________________           _______________          _______________  Hospital Representative                              Date                                Time

## 2024-10-22 ENCOUNTER — APPOINTMENT (EMERGENCY)
Dept: CT IMAGING | Facility: HOSPITAL | Age: 19
End: 2024-10-22
Payer: COMMERCIAL

## 2024-10-22 ENCOUNTER — HOSPITAL ENCOUNTER (EMERGENCY)
Facility: HOSPITAL | Age: 19
Discharge: HOME | End: 2024-10-22
Attending: EMERGENCY MEDICINE | Admitting: EMERGENCY MEDICINE
Payer: COMMERCIAL

## 2024-10-22 VITALS
RESPIRATION RATE: 16 BRPM | HEART RATE: 104 BPM | SYSTOLIC BLOOD PRESSURE: 118 MMHG | TEMPERATURE: 98.8 F | OXYGEN SATURATION: 100 % | DIASTOLIC BLOOD PRESSURE: 74 MMHG

## 2024-10-22 DIAGNOSIS — R10.9 ABDOMINAL PAIN: ICD-10-CM

## 2024-10-22 DIAGNOSIS — N39.0 UTI (URINARY TRACT INFECTION): Primary | ICD-10-CM

## 2024-10-22 LAB
ANION GAP SERPL CALCULATED.3IONS-SCNC: 7 MMOL/L (ref 4–13)
BACTERIA UR QL AUTO: ABNORMAL /HPF
BASOPHILS # BLD AUTO: 0.02 THOUSANDS/ΜL (ref 0–0.1)
BASOPHILS NFR BLD AUTO: 0 % (ref 0–1)
BILIRUB UR QL STRIP: NEGATIVE
BUN SERPL-MCNC: 9 MG/DL (ref 5–25)
CALCIUM SERPL-MCNC: 9 MG/DL (ref 8.4–10.2)
CHLORIDE SERPL-SCNC: 106 MMOL/L (ref 96–108)
CLARITY UR: CLEAR
CO2 SERPL-SCNC: 26 MMOL/L (ref 21–32)
COLOR UR: YELLOW
CREAT SERPL-MCNC: 0.65 MG/DL (ref 0.6–1.3)
EOSINOPHIL # BLD AUTO: 0.26 THOUSAND/ΜL (ref 0–0.61)
EOSINOPHIL NFR BLD AUTO: 3 % (ref 0–6)
ERYTHROCYTE [DISTWIDTH] IN BLOOD BY AUTOMATED COUNT: 12.9 % (ref 11.6–15.1)
EXT PREGNANCY TEST URINE: NEGATIVE
EXT. CONTROL: NORMAL
GFR SERPL CREATININE-BSD FRML MDRD: 127 ML/MIN/1.73SQ M
GLUCOSE SERPL-MCNC: 89 MG/DL (ref 65–140)
GLUCOSE UR STRIP-MCNC: NEGATIVE MG/DL
HCT VFR BLD AUTO: 40 % (ref 34.8–46.1)
HGB BLD-MCNC: 12.9 G/DL (ref 11.5–15.4)
HGB UR QL STRIP.AUTO: NEGATIVE
IMM GRANULOCYTES # BLD AUTO: 0.02 THOUSAND/UL (ref 0–0.2)
IMM GRANULOCYTES NFR BLD AUTO: 0 % (ref 0–2)
KETONES UR STRIP-MCNC: NEGATIVE MG/DL
LEUKOCYTE ESTERASE UR QL STRIP: ABNORMAL
LYMPHOCYTES # BLD AUTO: 1.11 THOUSANDS/ΜL (ref 0.6–4.47)
LYMPHOCYTES NFR BLD AUTO: 13 % (ref 14–44)
MCH RBC QN AUTO: 30.9 PG (ref 26.8–34.3)
MCHC RBC AUTO-ENTMCNC: 32.3 G/DL (ref 31.4–37.4)
MCV RBC AUTO: 96 FL (ref 82–98)
MONOCYTES # BLD AUTO: 0.73 THOUSAND/ΜL (ref 0.17–1.22)
MONOCYTES NFR BLD AUTO: 9 % (ref 4–12)
NEUTROPHILS # BLD AUTO: 6.42 THOUSANDS/ΜL (ref 1.85–7.62)
NEUTS SEG NFR BLD AUTO: 75 % (ref 43–75)
NITRITE UR QL STRIP: NEGATIVE
NON-SQ EPI CELLS URNS QL MICRO: ABNORMAL /HPF
NRBC BLD AUTO-RTO: 0 /100 WBCS
PH UR STRIP.AUTO: 7.5 [PH]
PLATELET # BLD AUTO: 179 THOUSANDS/UL (ref 149–390)
PMV BLD AUTO: 10.4 FL (ref 8.9–12.7)
POTASSIUM SERPL-SCNC: 3.8 MMOL/L (ref 3.5–5.3)
PROT UR STRIP-MCNC: NEGATIVE MG/DL
RBC # BLD AUTO: 4.18 MILLION/UL (ref 3.81–5.12)
RBC #/AREA URNS AUTO: ABNORMAL /HPF
SODIUM SERPL-SCNC: 139 MMOL/L (ref 135–147)
SP GR UR STRIP.AUTO: 1.01 (ref 1–1.03)
UROBILINOGEN UR QL STRIP.AUTO: 0.2 E.U./DL
WBC # BLD AUTO: 8.56 THOUSAND/UL (ref 4.31–10.16)
WBC #/AREA URNS AUTO: ABNORMAL /HPF

## 2024-10-22 PROCEDURE — 96361 HYDRATE IV INFUSION ADD-ON: CPT

## 2024-10-22 PROCEDURE — 99285 EMERGENCY DEPT VISIT HI MDM: CPT | Performed by: EMERGENCY MEDICINE

## 2024-10-22 PROCEDURE — 81025 URINE PREGNANCY TEST: CPT | Performed by: EMERGENCY MEDICINE

## 2024-10-22 PROCEDURE — 80048 BASIC METABOLIC PNL TOTAL CA: CPT | Performed by: EMERGENCY MEDICINE

## 2024-10-22 PROCEDURE — 96374 THER/PROPH/DIAG INJ IV PUSH: CPT

## 2024-10-22 PROCEDURE — 99284 EMERGENCY DEPT VISIT MOD MDM: CPT

## 2024-10-22 PROCEDURE — 85025 COMPLETE CBC W/AUTO DIFF WBC: CPT | Performed by: EMERGENCY MEDICINE

## 2024-10-22 PROCEDURE — 81001 URINALYSIS AUTO W/SCOPE: CPT | Performed by: EMERGENCY MEDICINE

## 2024-10-22 PROCEDURE — 36415 COLL VENOUS BLD VENIPUNCTURE: CPT | Performed by: EMERGENCY MEDICINE

## 2024-10-22 PROCEDURE — 74177 CT ABD & PELVIS W/CONTRAST: CPT

## 2024-10-22 RX ORDER — CEPHALEXIN 500 MG/1
500 CAPSULE ORAL EVERY 12 HOURS SCHEDULED
Qty: 10 CAPSULE | Refills: 0 | Status: SHIPPED | OUTPATIENT
Start: 2024-10-22 | End: 2024-10-27

## 2024-10-22 RX ORDER — KETOROLAC TROMETHAMINE 30 MG/ML
15 INJECTION, SOLUTION INTRAMUSCULAR; INTRAVENOUS ONCE
Status: COMPLETED | OUTPATIENT
Start: 2024-10-22 | End: 2024-10-22

## 2024-10-22 RX ADMIN — KETOROLAC TROMETHAMINE 15 MG: 30 INJECTION, SOLUTION INTRAMUSCULAR at 21:47

## 2024-10-22 RX ADMIN — CEPHALEXIN 500 MG: 250 CAPSULE ORAL at 23:24

## 2024-10-22 RX ADMIN — SODIUM CHLORIDE 1000 ML: 0.9 INJECTION, SOLUTION INTRAVENOUS at 21:45

## 2024-10-22 RX ADMIN — IOHEXOL 75 ML: 350 INJECTION, SOLUTION INTRAVENOUS at 23:00

## 2024-10-22 NOTE — Clinical Note
Shannan Enciso was seen and treated in our emergency department on 10/22/2024.    No restrictions            Diagnosis: JACQUELYN Campbell  may return to work on return date.    She may return on this date: 10/24/2024         If you have any questions or concerns, please don't hesitate to call.      Angelo Goodman MD    ______________________________           _______________          _______________  Hospital Representative                              Date                                Time

## 2024-10-23 NOTE — ED PROVIDER NOTES
Time reflects when diagnosis was documented in both MDM as applicable and the Disposition within this note       Time User Action Codes Description Comment    10/22/2024 11:20 PM Angelo Goodman [N39.0] UTI (urinary tract infection)     10/22/2024 11:20 PM Angelo Goodman [R10.9] Abdominal pain           ED Disposition       ED Disposition   Discharge    Condition   Stable    Date/Time   Tue Oct 22, 2024 11:20 PM    Comment   Shannan Enciso discharge to home/self care.                   Assessment & Plan       Medical Decision Making  19-year-old female presented to the emergency department for evaluation of abdominal pain.  On arrival patient awake, alert, oriented and in no acute distress.  On exam patient with suprapubic tenderness to palpation.  CT scan ordered to evaluate for acute pathology including but not limited to infection, abscess, obstruction, perforation, appendicitis.  CT scan with no acute findings.  Blood work grossly unremarkable.  Patient with occasional bacteria on UA with leukocytes.  Patient initially treated with Toradol and a fluid bolus.  On reevaluation the patient had significant improvement of symptoms.  All diagnostic studies were discussed with the patient in detail.  She is appropriate for discharge.  The patient was given an initial dose of Keflex here in the emergency department and provided with a prescription for a 5-day course.  Recommendation was made for the patient to follow-up with her PCP.  Return precautions were discussed.    Patient agrees with the plan for discharge and feels comfortable to go home with proper f/u. Advised to return for worsening or additional problems. Diagnostic tests were reviewed and questions answered. Diagnosis, care plan and treatment options were discussed. The patient understands instructions and will follow up as directed.        Amount and/or Complexity of Data Reviewed  Labs: ordered.  Radiology:  ordered.    Risk  Prescription drug management.             Medications   sodium chloride 0.9 % bolus 1,000 mL (0 mL Intravenous Stopped 10/22/24 2250)   ketorolac (TORADOL) injection 15 mg (15 mg Intravenous Given 10/22/24 2147)   iohexol (OMNIPAQUE) 350 MG/ML injection (SINGLE-DOSE) 100 mL (75 mL Intravenous Given 10/22/24 2300)   cephalexin (KEFLEX) capsule 500 mg (500 mg Oral Given 10/22/24 2324)       ED Risk Strat Scores                                               History of Present Illness       Chief Complaint   Patient presents with    Possible UTI     Pt reports dysuria, increased frequency, lower abdominal pain for the past few days. Pt reports taking tylenol today around 1900. Pt denies n/v/d       Past Medical History:   Diagnosis Date    Anemia during pregnancy in third trimester 12/18/2022      History reviewed. No pertinent surgical history.   Family History   Problem Relation Age of Onset    Thyroid disease Mother     No Known Problems Sister     No Known Problems Brother     No Known Problems Brother     No Known Problems Brother     Heart disease Maternal Grandfather     No Known Problems Paternal Grandmother     No Known Problems Paternal Grandfather     Thyroid disease Maternal Aunt     Thyroid disease Maternal Aunt       Social History     Tobacco Use    Smoking status: Never    Smokeless tobacco: Never   Vaping Use    Vaping status: Some Days    Substances: Nicotine   Substance Use Topics    Alcohol use: Not Currently    Drug use: Never      E-Cigarette/Vaping    E-Cigarette Use Current Some Day User       E-Cigarette/Vaping Substances    Nicotine Yes     THC No     CBD No     Flavoring No     Other No     Unknown No       I have reviewed and agree with the history as documented.     19-year-old female presents to the emergency department for evaluation of abdominal pain.  The patient is primarily Afghan speaking.  An  was used for translation.  Patient reports lower abdominal  pain that she describes as cramping that has been present over the past few days.  She reports associated burning with urination and increased frequency.  She states that she has been taking Tylenol to treat her symptoms with only minimal relief.  No fevers, chills, nausea, vomiting, diarrhea.  No back or flank pain.        Review of Systems   Constitutional:  Negative for chills and fever.   HENT:  Negative for ear pain and sore throat.    Eyes:  Negative for pain and visual disturbance.   Respiratory:  Negative for cough and shortness of breath.    Cardiovascular:  Negative for chest pain and palpitations.   Gastrointestinal:  Positive for abdominal pain. Negative for vomiting.   Genitourinary:  Positive for dysuria and frequency. Negative for hematuria.   Musculoskeletal:  Negative for arthralgias and back pain.   Skin:  Negative for color change and rash.   Neurological:  Negative for seizures and syncope.   All other systems reviewed and are negative.          Objective       ED Triage Vitals   Temperature Pulse Blood Pressure Respirations SpO2 Patient Position - Orthostatic VS   10/22/24 2049 10/22/24 2049 10/22/24 2049 10/22/24 2049 10/22/24 2049 10/22/24 2049   98.8 °F (37.1 °C) 87 123/83 16 100 % Sitting      Temp Source Heart Rate Source BP Location FiO2 (%) Pain Score    10/22/24 2049 10/22/24 2049 10/22/24 2049 -- 10/22/24 2147    Oral Monitor Left arm  10 - Worst Possible Pain      Vitals      Date and Time Temp Pulse SpO2 Resp BP Pain Score FACES Pain Rating User   10/22/24 2239 -- 104 100 % 16 118/74 4 -- KLB   10/22/24 2147 -- -- -- -- -- 10 - Worst Possible Pain -- KLB   10/22/24 2049 98.8 °F (37.1 °C) 87 100 % 16 123/83 -- -- RN            Physical Exam  Vitals and nursing note reviewed.   Constitutional:       General: She is not in acute distress.     Appearance: She is well-developed.   HENT:      Head: Normocephalic and atraumatic.   Eyes:      Conjunctiva/sclera: Conjunctivae normal.    Cardiovascular:      Rate and Rhythm: Normal rate and regular rhythm.      Heart sounds: No murmur heard.  Pulmonary:      Effort: Pulmonary effort is normal. No respiratory distress.      Breath sounds: Normal breath sounds.   Abdominal:      Palpations: Abdomen is soft.      Tenderness: There is abdominal tenderness in the suprapubic area. There is no guarding or rebound.   Musculoskeletal:         General: No swelling.      Cervical back: Neck supple.   Skin:     General: Skin is warm and dry.      Capillary Refill: Capillary refill takes less than 2 seconds.   Neurological:      Mental Status: She is alert.   Psychiatric:         Mood and Affect: Mood normal.         Results Reviewed       Procedure Component Value Units Date/Time    Basic metabolic panel [[id number]] Collected: 10/22/24 2143    Lab Status: Final result Specimen: Blood from Arm, Right Updated: 10/22/24 2204     Sodium 139 mmol/L      Potassium 3.8 mmol/L      Chloride 106 mmol/L      CO2 26 mmol/L      ANION GAP 7 mmol/L      BUN 9 mg/dL      Creatinine 0.65 mg/dL      Glucose 89 mg/dL      Calcium 9.0 mg/dL      eGFR 127 ml/min/1.73sq m     Narrative:      National Kid[employer] Foundation guidelines for Chronic Kidney Disease (CKD):     Stage 1 with normal or high GFR (GFR > 90 mL/min/1.73 square meters)    Stage 2 Mild CKD (GFR = 60-89 mL/min/1.73 square meters)    Stage 3A Moderate CKD (GFR = 45-59 mL/min/1.73 square meters)    Stage 3B Moderate CKD (GFR = 30-44 mL/min/1.73 square meters)    Stage 4 Severe CKD (GFR = 15-29 mL/min/1.73 square meters)    Stage 5 End Stage CKD (GFR <15 mL/min/1.73 square meters)  Note: GFR calculation is accurate only with a steady state creatinine    CBC and differential [[id number]]  (Abnormal) Collected: 10/22/24 2143    Lab Status: Final result Specimen: Blood from Arm, Right Updated: 10/22/24 2147     WBC 8.56 Thousand/uL      RBC 4.18 Million/uL      Hemoglobin 12.9 g/dL      Hematocrit 40.0 %      MCV  96 fL      MCH 30.9 pg      MCHC 32.3 g/dL      RDW 12.9 %      MPV 10.4 fL      Platelets 179 Thousands/uL      nRBC 0 /100 WBCs      Segmented % 75 %      Immature Grans % 0 %      Lymphocytes % 13 %      Monocytes % 9 %      Eosinophils Relative 3 %      Basophils Relative 0 %      Absolute Neutrophils 6.42 Thousands/µL      Absolute Immature Grans 0.02 Thousand/uL      Absolute Lymphocytes 1.11 Thousands/µL      Absolute Monocytes 0.73 Thousand/µL      Eosinophils Absolute 0.26 Thousand/µL      Basophils Absolute 0.02 Thousands/µL     Urine Microscopic [[id number]]  (Abnormal) Collected: 10/22/24 2104    Lab Status: Final result Specimen: Urine, Clean Catch Updated: 10/22/24 2119     RBC, UA 0-1 /hpf      WBC, UA 2-4 /hpf      Epithelial Cells Innumerable /hpf      Bacteria, UA Occasional /hpf     UA w Reflex to Microscopic w Reflex to Culture [[id number]]  (Abnormal) Collected: 10/22/24 2104    Lab Status: Final result Specimen: Urine, Clean Catch Updated: 10/22/24 2111     Color, UA Yellow     Clarity, UA Clear     Specific Gravity, UA 1.015     pH, UA 7.5     Leukocytes, UA Trace     Nitrite, UA Negative     Protein, UA Negative mg/dl      Glucose, UA Negative mg/dl      Ketones, UA Negative mg/dl      Urobilinogen, UA 0.2 E.U./dl      Bilirubin, UA Negative     Occult Blood, UA Negative    POCT pregnancy, urine [[id number]]  (Normal) Resulted: 10/22/24 2110    Lab Status: Final result Updated: 10/22/24 2110     EXT Preg Test, Ur Negative     Control Valid            CT abdomen pelvis with contrast   Final Interpretation by Reggie Lopez MD (10/22 2312)      No evidence of acute intra-abdominal or pelvic process.         Workstation performed: [id number]             Procedures    ED Medication and Procedure Management   Prior to Admission Medications   Prescriptions Last Dose Informant Patient Reported? Taking?   Prenatal Vit-Fe Fumarate-FA (M-Tonie Plus) 27-1 MG TABS   No No   Sig: TAKE 1 TABLET BY MOUTH  EVERY DAY IN THE MORNING   acetaminophen (TYLENOL) 325 mg tablet  Self No No   Sig: Take 2 tablets (650 mg total) by mouth every 6 (six) hours as needed for mild pain   Patient not taking: Reported on 2023   docusate sodium (COLACE) 100 mg capsule   No No   Sig: Take 1 capsule (100 mg total) by mouth 2 (two) times a day   ferrous sulfate 324 (65 Fe) mg  Self No No   Sig: TAKE 1 TABLET BY MOUTH 2 TIMES A DAY BEFORE MEALS   ibuprofen (MOTRIN) 600 mg tablet   No No   Sig: Take 1 tablet (600 mg total) by mouth every 6 (six) hours as needed for mild pain or moderate pain      Facility-Administered Medications: None     Current Discharge Medication List        START taking these medications    Details   cephalexin (KEFLEX) 500 mg capsule Take 1 capsule (500 mg total) by mouth every 12 (twelve) hours for 5 days  Qty: 10 capsule, Refills: 0    Associated Diagnoses: UTI (urinary tract infection)           CONTINUE these medications which have NOT CHANGED    Details   acetaminophen (TYLENOL) 325 mg tablet Take 2 tablets (650 mg total) by mouth every 6 (six) hours as needed for mild pain  Refills: 0    Associated Diagnoses:  (spontaneous vaginal delivery)      docusate sodium (COLACE) 100 mg capsule Take 1 capsule (100 mg total) by mouth 2 (two) times a day  Qty: 60 capsule, Refills: 30    Associated Diagnoses: Anemia affecting pregnancy in second trimester      ferrous sulfate 324 (65 Fe) mg TAKE 1 TABLET BY MOUTH 2 TIMES A DAY BEFORE MEALS  Qty: 180 tablet, Refills: 2    Associated Diagnoses: Anemia affecting pregnancy in second trimester      ibuprofen (MOTRIN) 600 mg tablet Take 1 tablet (600 mg total) by mouth every 6 (six) hours as needed for mild pain or moderate pain  Qty: 30 tablet, Refills: 0    Associated Diagnoses: Left arm pain      Prenatal Vit-Fe Fumarate-FA (M- Plus) 27-1 MG TABS TAKE 1 TABLET BY MOUTH EVERY DAY IN THE MORNING  Qty: 90 tablet, Refills: 1    Associated Diagnoses: Lactating mother            No discharge procedures on file.  ED SEPSIS DOCUMENTATION   Time reflects when diagnosis was documented in both MDM as applicable and the Disposition within this note       Time User Action Codes Description Comment    10/22/2024 11:20 PM Angelo Goodman [N39.0] UTI (urinary tract infection)     10/22/2024 11:20 PM Angelo Goodman [R10.9] Abdominal pain                  Angelo Goodman MD  10/22/24 3905

## 2024-11-04 ENCOUNTER — OFFICE VISIT (OUTPATIENT)
Dept: OBGYN CLINIC | Facility: CLINIC | Age: 19
End: 2024-11-04

## 2024-11-04 VITALS
DIASTOLIC BLOOD PRESSURE: 69 MMHG | RESPIRATION RATE: 18 BRPM | SYSTOLIC BLOOD PRESSURE: 106 MMHG | HEART RATE: 69 BPM | WEIGHT: 109 LBS | BODY MASS INDEX: 22.02 KG/M2

## 2024-11-04 DIAGNOSIS — Z20.2 POSSIBLE EXPOSURE TO STD: ICD-10-CM

## 2024-11-04 DIAGNOSIS — N89.8 VAGINAL DISCHARGE: ICD-10-CM

## 2024-11-04 DIAGNOSIS — B37.31 VAGINAL YEAST INFECTION: Primary | ICD-10-CM

## 2024-11-04 PROBLEM — O35.EXX0 PYELECTASIS OF FETUS ON PRENATAL ULTRASOUND: Status: RESOLVED | Noted: 2022-09-07 | Resolved: 2024-11-04

## 2024-11-04 LAB
BV WHIFF TEST VAG QL: ABNORMAL
CLUE CELLS SPEC QL WET PREP: ABNORMAL
PH SMN: 4.5 [PH]
SL AMB POCT WET MOUNT: ABNORMAL
T VAGINALIS VAG QL WET PREP: ABNORMAL
YEAST VAG QL WET PREP: ABNORMAL

## 2024-11-04 PROCEDURE — 99214 OFFICE O/P EST MOD 30 MIN: CPT | Performed by: NURSE PRACTITIONER

## 2024-11-04 PROCEDURE — 87591 N.GONORRHOEAE DNA AMP PROB: CPT | Performed by: NURSE PRACTITIONER

## 2024-11-04 PROCEDURE — 87210 SMEAR WET MOUNT SALINE/INK: CPT | Performed by: NURSE PRACTITIONER

## 2024-11-04 PROCEDURE — 87491 CHLMYD TRACH DNA AMP PROBE: CPT | Performed by: NURSE PRACTITIONER

## 2024-11-04 RX ORDER — FLUCONAZOLE 150 MG/1
150 TABLET ORAL ONCE
Qty: 1 TABLET | Refills: 0 | Status: SHIPPED | OUTPATIENT
Start: 2024-11-04 | End: 2024-11-04

## 2024-11-04 NOTE — PROGRESS NOTES
Ambulatory Visit  Name: Shannan Enciso      : 2005      MRN: [id number]  Encounter Provider: PRINCE Souza  Encounter Date: 2024   Encounter department: [facility]    RTO in 2 wks for annual   Assessment & Plan  Vaginal discharge    Orders:    POCT wet mount    Vaginal yeast infection    Orders:    fluconazole (DIFLUCAN) 150 mg tablet; Take 1 tablet (150 mg total) by mouth once for 1 dose    Possible exposure to STD    Orders:    HIV 1/2 AG/AB w Reflex [facility] for 2 yr old and above; Future    RPR-Syphilis Screening (Total Syphilis IGG/IGM); Future    Hepatitis B surface antigen    Hepatitis C antibody; Future    Chlamydia/GC amplified DNA by PCR      History of Present Illness     Shannan Enciso is a 19 y.o. female who presents here for annual but has a problem so sh ewill RTO for her annual in 1-2 wks. She reports vaginal discharge x2 weeks. Has vaginal itching and odor, but not fishy odor. She was on antibiotics for  2 wks ago and treated for a UTI at the ED.  She reports that her symptoms of her UTI have resolved.  Desires STD testing including blood work, desires culture for chlamydia and gonorrhea.     She has a Nexplanon that was placed after her delivery 2023    History obtained from : patient  Review of Systems   Constitutional:  Negative for chills and fever.   Respiratory: Negative.     Genitourinary:  Positive for vaginal discharge. Negative for menstrual problem.        Itching     Pertinent Medical History   Past Medical History:   Diagnosis Date    Anemia during pregnancy in third trimester 2022              Objective     /69 (BP Location: Left arm, Patient Position: Sitting, Cuff Size: Adult)   Pulse 69   Resp 18   Wt 49.4 kg (109 lb)   LMP 10/31/2024 (Approximate)   Breastfeeding Unknown   BMI 22.02 kg/m²     Physical Exam  Constitutional:       Appearance: Normal appearance.   Cardiovascular:      Rate  and Rhythm: Normal rate.   Pulmonary:      Effort: Pulmonary effort is normal.   Abdominal:      Palpations: Abdomen is soft.      Tenderness: There is no abdominal tenderness.   Skin:     General: Skin is warm and dry.     External genitalia-no lesions or erythema  Vagina- small amount  thick white discharge  Cervix-negative CMT no lesions  Uterus-anteverted, nontender  Adnexa-no masses nontender    Wet mount KOH + yeast hyphae    Nexplanon present in left upper arm.   Administrative Statements

## 2024-11-04 NOTE — ASSESSMENT & PLAN NOTE
Orders:    HIV 1/2 AG/AB w Reflex [facility] for 2 yr old and above; Future    RPR-Syphilis Screening (Total Syphilis IGG/IGM); Future    Hepatitis B surface antigen    Hepatitis C antibody; Future    Chlamydia/GC amplified DNA by PCR

## 2024-11-05 LAB
C TRACH DNA SPEC QL NAA+PROBE: NEGATIVE
N GONORRHOEA DNA SPEC QL NAA+PROBE: NEGATIVE

## 2024-11-06 ENCOUNTER — TELEPHONE (OUTPATIENT)
Dept: OBGYN CLINIC | Facility: CLINIC | Age: 19
End: 2024-11-06

## 2024-11-06 NOTE — TELEPHONE ENCOUNTER
ID [id number]    Attempted to call, left a message referring patient to my chart to review recent test results. Office number provided in message in case of any questions or concerns.

## 2024-11-06 NOTE — TELEPHONE ENCOUNTER
----- Message from PRINCE Ovalle sent at 11/6/2024  9:18 AM EST -----  Please inform pt that her culture for chlamydia and gonorrhea were negative.  Thank You!

## 2024-11-11 PROBLEM — O13.9 GESTATIONAL HYPERTENSION: Status: RESOLVED | Noted: 2023-01-19 | Resolved: 2024-11-11

## 2025-03-13 ENCOUNTER — OFFICE VISIT (OUTPATIENT)
Dept: OBGYN CLINIC | Facility: CLINIC | Age: 20
End: 2025-03-13

## 2025-03-13 VITALS
BODY MASS INDEX: 22.98 KG/M2 | WEIGHT: 114 LBS | HEART RATE: 77 BPM | DIASTOLIC BLOOD PRESSURE: 69 MMHG | HEIGHT: 59 IN | SYSTOLIC BLOOD PRESSURE: 111 MMHG

## 2025-03-13 DIAGNOSIS — Z97.5 NEXPLANON IN PLACE: ICD-10-CM

## 2025-03-13 DIAGNOSIS — B37.31 VAGINAL YEAST INFECTION: ICD-10-CM

## 2025-03-13 DIAGNOSIS — Z01.419 ENCOUNTER FOR GYNECOLOGICAL EXAMINATION WITHOUT ABNORMAL FINDING: Primary | ICD-10-CM

## 2025-03-13 PROCEDURE — G0145 SCR C/V CYTO,THINLAYER,RESCR: HCPCS | Performed by: NURSE PRACTITIONER

## 2025-03-13 PROCEDURE — S0612 ANNUAL GYNECOLOGICAL EXAMINA: HCPCS | Performed by: NURSE PRACTITIONER

## 2025-03-13 RX ORDER — FLUCONAZOLE 150 MG/1
150 TABLET ORAL ONCE
Qty: 1 TABLET | Refills: 0 | Status: SHIPPED | OUTPATIENT
Start: 2025-03-13 | End: 2025-03-13

## 2025-03-13 NOTE — PROGRESS NOTES
Name: Shannan Enciso      : 2005      MRN: [id number]  Encounter Provider: PRINCE Souza  Encounter Date: 3/13/2025   [facility]  :  Assessment & Plan        History of Present Illness {?Quick Links Encounters * My Last Note * Last Note in Specialty * Snapshot * Since Last Visit * History :06214}  HPI  Shannan Enciso is a 20 y.o. female who presents ***    Has a Nexplanon that was placed after delivery 2023  {History obtained from(Optional):77291}    Review of Systems  {Select to insert medical history sections (Optional):10424}     Objective {?Quick Links Trend Vitals * Enter New Vitals * Results Review * Timeline (Adult) * Labs * Imaging * Cardiology * Procedures * Lung Cancer Screening * Surgical eConsent :12113}  There were no vitals taken for this visit.     Physical Exam    {Administrative / Billing Section (Optional):22741}

## 2025-03-13 NOTE — PROGRESS NOTES
ASSESSMENT & PLAN: Shannan Enciso is a 20 y.o.  with normal gynecologic exam.    1.  Routine well woman exam done today  2.  Pap:  The patient's last pap was - 1ST pap today .    Pap was done today.    Current ASCCP Guidelines reviewed.   3.  STD testing  was not done pt declines, had negative GC and CT 2924  4.  Gardasil recommendations reviewed  is vaccinated with 1 dose, she will see if she has records. .  5. The following were reviewed in today's visit: breast self exam, STD testing, adequate intake of calcium and vitamin D, exercise, healthy diet, and patient declined STD blood work  6. Nexplanon placed  23  7.  Treat empirically for yeast infection with fluconazole x 1 dose      Depression Screening and Follow-up Plan: Patient was screened for depression during today's encounter. They screened negative with a PHQ-2 score of 0.          CC:  Annual Gynecologic Examination    HPI: Shannan Enciso is a 20 y.o.  who presents for annual gynecologic examination.  Just back from [place], has vaginal itching and a little bit of white dsch. Reports is was hot there and was sweaty.     Health Maintenance:    She wears her seatbelt routinely.    She does perform regular monthly self breast exams.    She feels safe at home.     Past Medical History:   Diagnosis Date    Anemia during pregnancy in third trimester 2022    Gestational hypertension 2023       History reviewed. No pertinent surgical history.    OB/Gyn History:    Pt does not have menstrual issues.     History of sexually transmitted infection: No.  History of abnormal pap smears: No 1st pap done today.    Patient is currently sexually active.    The current method of family planning is Nexplanon .    OB History          1    Para   1    Term   1            AB        Living   1         SAB        IAB        Ectopic        Multiple   0    Live Births   1                 Family History   Problem Relation Age  of Onset    Thyroid disease Mother     No Known Problems Sister     No Known Problems Brother     No Known Problems Brother     No Known Problems Brother     Heart disease Maternal Grandfather     No Known Problems Paternal Grandmother     No Known Problems Paternal Grandfather     Thyroid disease Maternal Aunt     Thyroid disease Maternal Aunt     Breast cancer Neg Hx     Colon cancer Neg Hx     Ovarian cancer Neg Hx        Social History:  Social History     Socioeconomic History    Marital status: Single     Spouse name: Not on file    Number of children: 0    Years of education: 12    Highest education level: High school graduate   Occupational History    Not on file   Tobacco Use    Smoking status: Never    Smokeless tobacco: Never   Vaping Use    Vaping status: Former    Substances: Nicotine   Substance and Sexual Activity    Alcohol use: Not Currently    Drug use: Never    Sexual activity: Yes     Partners: Male     Birth control/protection: Implant   Other Topics Concern    Not on file   Social History Narrative    Not on file     Social Drivers of Health     Financial Resource Strain: Low Risk  (3/13/2025)    Overall Financial Resource Strain (CARDIA)     Difficulty of Paying Living Expenses: Not hard at all   Food Insecurity: No Food Insecurity (3/13/2025)    Hunger Vital Sign     Worried About Running Out of Food in the Last Year: Never true     Ran Out of Food in the Last Year: Never true   Transportation Needs: No Transportation Needs (3/13/2025)    PRAPARE - Transportation     Lack of Transportation (Medical): No     Lack of Transportation (Non-Medical): No   Physical Activity: Insufficiently Active (7/29/2022)    Exercise Vital Sign     Days of Exercise per Week: 3 days     Minutes of Exercise per Session: 30 min   Stress: Not on file   Social Connections: Moderately Isolated (7/29/2022)    Social Connection and Isolation Panel [NHANES]     Frequency of Communication with Friends and Family: More than  three times a week     Frequency of Social Gatherings with Friends and Family: More than three times a week     Attends [employer] Services: Never     Active Member of Clubs or Organizations: No     Attends Club or Organization Meetings: Never     Marital Status: Living with partner   Intimate Partner Violence: Not At Risk (7/28/2022)    Humiliation, Afraid, Rape, and Kick questionnaire     Fear of Current or Ex-Partner: No     Emotionally Abused: No     Physically Abused: No     Sexually Abused: No   Housing Stability: Low Risk  (3/13/2025)    Housing Stability Vital Sign     Unable to Pay for Housing in the Last Year: No     Number of Times Moved in the Last Year: 1     Homeless in the Last Year: No     Patient is single.  Patient is currently employed works at a hotel    No Known Allergies      Current Outpatient Medications:     etonogestrel (NEXPLANON) subdermal implant, 68 mg by Subdermal route Once every 3 years, Disp: , Rfl:     fluconazole (DIFLUCAN) 150 mg tablet, Take 1 tablet (150 mg total) by mouth once for 1 dose, Disp: 1 tablet, Rfl: 0    Review of Systems:  Constitutional :no fever, feels well, no tiredness, no recent weight gain or loss  ENT: no ear ache, no loss of hearing, no nosebleeds or nasal discharge, no sore throat or hoarseness.  Cardiovascular: no complaints of slow or fast heart beat, no chest pain, no palpitations, no leg claudication or lower extremity edema.  Respiratory: no complaints of shortness of shortness of breath, no BARTON  Breasts:no complaints of breast pain, breast lump, or nipple discharge  Gastrointestinal: no complaints of abdominal pain, constipation, nausea, vomiting, or diarrhea or bloody stools  Genitourinary : no complaints of dysuria, incontinence, pelvic pain, no dysmenorrhea, vaginal discharge or abnormal vaginal bleeding and as noted in HPI.  Musculoskeletal: no complaints of arthralgia, no myalgia, no joint swelling or stiffness, no limb pain or  "swelling.  Integumentary: no complaints of skin rash or lesion, itching or dry skin  Neurological: no complaints of headache, no confusion, no numbness or tingling, no dizziness or fainting    Objective      /69 (BP Location: Left arm, Patient Position: Sitting)   Pulse 77   Ht 4' 11\" (1.499 m)   Wt 51.7 kg (114 lb)   LMP 03/03/2025 (Approximate)   BMI 23.03 kg/m²     General:   appears stated age, cooperative, alert normal mood and affect   Neck: normal, supple,trachea midline, no masses   Heart: regular rate and rhythm, S1, S2 normal, no murmur, click, rub or gallop   Lungs: clear to auscultation bilaterally   Breasts: normal appearance, no masses or tenderness, Inspection negative, No nipple retraction or dimpling, No nipple discharge or bleeding, No axillary or supraclavicular adenopathy, Normal to palpation without dominant masses, Taught monthly breast self examination   Abdomen: soft, non-tender, without masses or organomegaly   Vulva: Bartholin's, Urethra, [place] normal   Vagina: normal vagina, no discharge, exudate, lesion, or erythema   Urethra: normal   Cervix: Normal, no discharge. PAP done. Nontender.   Uterus: normal size, contour, position, consistency, mobility, non-tender and mobile   Adnexa: no mass, fullness, tenderness   Lymphatic palpation of lymph nodes in neck, axilla, groin and/or other locations: no lymphadenopathy or masses noted   Skin normal skin turgor and no rashes.   Psychiatric orientation to person, place, and time: normal. mood and affect: normal        Nexplanon Palpable in left upper arm    "

## 2025-03-19 ENCOUNTER — RESULTS FOLLOW-UP (OUTPATIENT)
Dept: OBGYN CLINIC | Facility: CLINIC | Age: 20
End: 2025-03-19

## 2025-03-19 LAB
LAB AP GYN PRIMARY INTERPRETATION: NORMAL
Lab: NORMAL